# Patient Record
Sex: FEMALE | Race: WHITE | NOT HISPANIC OR LATINO | Employment: FULL TIME | URBAN - METROPOLITAN AREA
[De-identification: names, ages, dates, MRNs, and addresses within clinical notes are randomized per-mention and may not be internally consistent; named-entity substitution may affect disease eponyms.]

---

## 2017-05-16 ENCOUNTER — TELEPHONE (OUTPATIENT)
Dept: OBSTETRICS AND GYNECOLOGY | Facility: CLINIC | Age: 32
End: 2017-05-16

## 2017-05-16 NOTE — TELEPHONE ENCOUNTER
----- Message from Chel Gudino sent at 5/16/2017  9:14 AM CDT -----  Contact: LIBRA MEDEIROS [[id number]]  X_  1st Request  _  2nd Request  _  3rd Request        Who: LIBRA MEDEIROS [[id number]]    Why: Patient states she being referred from Dr Shell Raymundo for recurrent yeast infections. She would like to schedule an [facility]t [facility]. Thanks.      What Number to Call Back: [phone]    When to Expect a call back: (Before the end of the day)   -- if call after 3:00 call back will be tomorrow.

## 2017-05-16 NOTE — TELEPHONE ENCOUNTER
LM on VM that [facility] appts for NP not available at this time and will call pt back when schedule becomes available, possibly end August/early September.

## 2017-05-22 ENCOUNTER — TELEPHONE (OUTPATIENT)
Dept: OBSTETRICS AND GYNECOLOGY | Facility: CLINIC | Age: 32
End: 2017-05-22

## 2017-05-22 NOTE — TELEPHONE ENCOUNTER
----- Message from Wesley Carlos III, MD sent at 5/22/2017  1:59 PM CDT -----  Regarding: FW: clinic appointment  Marj  Please schedule this pt on Thursday 6/8/17  ----- Message -----  From: Joyce Haddad MD  Sent: 5/22/2017   1:45 PM  To: Wesley Carlos III, MD, John Matthews RN  Subject: clinic appointment                               I asked Dr. Carlos for a favor :)    This is a very sweet friend of mine who is undergoing fertility treatments with Shell Raymundo. She has some sort of chronic vulvovaginal infections. She has had ureaplasma in the past. [facility]ary referred her to [facility] but she was told there wasn't a new patient appointment available until September.     I spoke with john and he said he would be willing to do me a favor and see her sooner in the next few weeks :)     Thank you!

## 2017-06-07 ENCOUNTER — TELEPHONE (OUTPATIENT)
Dept: OBSTETRICS AND GYNECOLOGY | Facility: CLINIC | Age: 32
End: 2017-06-07

## 2017-06-07 NOTE — TELEPHONE ENCOUNTER
----- Message from Mahnaz Clayton sent at 6/7/2017  8:57 AM CDT -----  Contact: self  Patient states she will r/s for June 9 at 9:00am. Patient however is inquiring whether or not she can move her appointment for next week for the [facility]. Patient advised Marj was out of the office and will return her call tomorrow. Patient states that if no appointment is available for next week she will keep her appointment on Friday June 9th. Patient can be reached at [phone] Thanks!

## 2017-06-07 NOTE — TELEPHONE ENCOUNTER
Patient was informed that Dr. Terrance ALMODOVAR does not have any appointments available on next week for the [facility],patient was instructed to keep her scheduled visit on Friday 6/9 at 9:00am,verbilazed understanding.

## 2017-06-09 ENCOUNTER — OFFICE VISIT (OUTPATIENT)
Dept: OBSTETRICS AND GYNECOLOGY | Facility: CLINIC | Age: 32
End: 2017-06-09
Attending: OBSTETRICS & GYNECOLOGY
Payer: COMMERCIAL

## 2017-06-09 VITALS
SYSTOLIC BLOOD PRESSURE: 100 MMHG | BODY MASS INDEX: 18.99 KG/M2 | WEIGHT: 103.19 LBS | DIASTOLIC BLOOD PRESSURE: 78 MMHG | HEIGHT: 62 IN

## 2017-06-09 DIAGNOSIS — N89.8 VAGINAL DISCHARGE: ICD-10-CM

## 2017-06-09 DIAGNOSIS — L90.0 LICHEN SCLEROSUS: ICD-10-CM

## 2017-06-09 DIAGNOSIS — B37.31 CANDIDIASIS OF VULVA AND VAGINA: ICD-10-CM

## 2017-06-09 DIAGNOSIS — L29.2 PRURITUS OF VULVA: Primary | ICD-10-CM

## 2017-06-09 DIAGNOSIS — N94.10 DYSPAREUNIA, FEMALE: ICD-10-CM

## 2017-06-09 PROCEDURE — 99999 PR PBB SHADOW E&M-EST. PATIENT-LVL III: CPT | Mod: PBBFAC,,, | Performed by: OBSTETRICS & GYNECOLOGY

## 2017-06-09 PROCEDURE — 99244 OFF/OP CNSLTJ NEW/EST MOD 40: CPT | Mod: S$GLB,,, | Performed by: OBSTETRICS & GYNECOLOGY

## 2017-06-09 PROCEDURE — 87210 SMEAR WET MOUNT SALINE/INK: CPT | Mod: QW,S$GLB,, | Performed by: OBSTETRICS & GYNECOLOGY

## 2017-06-09 PROCEDURE — 87102 FUNGUS ISOLATION CULTURE: CPT

## 2017-06-09 RX ORDER — MEDROXYPROGESTERONE ACETATE 10 MG/1
TABLET ORAL
Refills: 0 | COMMUNITY
Start: 2017-05-11 | End: 2024-01-17

## 2017-06-09 RX ORDER — CLOBETASOL PROPIONATE 0.5 MG/G
OINTMENT TOPICAL
Qty: 45 G | Refills: 3 | Status: SHIPPED | OUTPATIENT
Start: 2017-06-09 | End: 2024-01-17

## 2017-07-12 LAB — FUNGUS SPEC CULT: NORMAL

## 2018-03-28 ENCOUNTER — TELEPHONE (OUTPATIENT)
Dept: OBSTETRICS AND GYNECOLOGY | Facility: CLINIC | Age: 33
End: 2018-03-28

## 2018-03-28 NOTE — TELEPHONE ENCOUNTER
----- Message from Rolly Blake sent at 3/28/2018 10:57 AM CDT -----  Contact: pt  x_ 1st Request  _ 2nd Request  _ 3rd Request    Who: pt    Why: is needing to schedule a appointment for [facility]    What Number to Call Back: [phone]    When to Expect a call back: (Before the end of the day)  -- if call after 3:00 call back will be tomorrow.

## 2018-03-28 NOTE — TELEPHONE ENCOUNTER
States recently found out she is pregnant and OB took her off clobetasol. Wants to come in to discuss with Dr Carlos, states hasn't follow up since last visit. Denies any problems. States coming in from [place] and appt date given as requested

## 2018-04-24 ENCOUNTER — OFFICE VISIT (OUTPATIENT)
Dept: OBSTETRICS AND GYNECOLOGY | Facility: CLINIC | Age: 33
End: 2018-04-24
Attending: OBSTETRICS & GYNECOLOGY
Payer: COMMERCIAL

## 2018-04-24 VITALS
BODY MASS INDEX: 17.64 KG/M2 | HEIGHT: 62 IN | DIASTOLIC BLOOD PRESSURE: 80 MMHG | SYSTOLIC BLOOD PRESSURE: 128 MMHG | WEIGHT: 95.88 LBS

## 2018-04-24 DIAGNOSIS — N94.10 DYSPAREUNIA, FEMALE: ICD-10-CM

## 2018-04-24 DIAGNOSIS — N89.8 VAGINAL DISCHARGE: ICD-10-CM

## 2018-04-24 DIAGNOSIS — L90.0 LICHEN SCLEROSUS: ICD-10-CM

## 2018-04-24 DIAGNOSIS — L29.2 PRURITUS OF VULVA: Primary | ICD-10-CM

## 2018-04-24 PROCEDURE — 99213 OFFICE O/P EST LOW 20 MIN: CPT | Mod: S$GLB,,, | Performed by: OBSTETRICS & GYNECOLOGY

## 2018-04-24 PROCEDURE — 99999 PR PBB SHADOW E&M-EST. PATIENT-LVL III: CPT | Mod: PBBFAC,,, | Performed by: OBSTETRICS & GYNECOLOGY

## 2018-04-24 RX ORDER — DOXYLAMINE SUCCINATE AND PYRIDOXINE HYDROCHLORIDE 10; 10 MG/1; MG/1
TABLET, DELAYED RELEASE ORAL
COMMUNITY
Start: 2018-04-17 | End: 2024-01-17

## 2018-04-24 RX ORDER — HEPARIN SODIUM 5000 [USP'U]/ML
INJECTION, SOLUTION INTRAVENOUS; SUBCUTANEOUS
COMMUNITY
Start: 2018-04-03 | End: 2024-01-17

## 2018-04-24 RX ORDER — DESONIDE 0.5 MG/G
CREAM TOPICAL
COMMUNITY
Start: 2018-03-02 | End: 2024-01-17

## 2018-04-24 RX ORDER — ESTRADIOL 2 MG/1
TABLET ORAL
COMMUNITY
Start: 2018-04-18 | End: 2024-01-17

## 2018-04-24 NOTE — PROGRESS NOTES
Subjective:     Patient ID: Brielle Sharpe is a 32 y.o. female.     Chief Complaint: No chief complaint on file.     History of Present Illness: This patient is a 32 y.o. female, who [facility] for evaluation of probable early onset lichen sclerosis.  The patient is pregnant with twins approximately 8 weeks gestation today she is concerned about the use of the clobetasol and has been advised to discontinue the use of clobetasol while she is pregnant.     No LMP recorded.    Review of Systems    GENERAL: No fever, chills, fatigability or weightchange  SKIN: No rashes, itching or changes in color or texture of skin.  HEAD: No headaches or recent head trauma.  EYES: Visual acuity fine. No photophobia,r diplopia.  EARS: Denies earache or vertigo  NOSE: No loss of smell, no epistaxis or postnasal drip.  MOUTH & THROAT: No hoarseness or change in voice.   NODES: Denies swollen glands.  CHEST: Denies VIRAMONTES, cyanosis, wheezing, cough and sputum production.  CARDIOVASCULAR: Denies chest pain, PND, orthopnea or reduced exercise tolerance.  ABDOMEN: Appetite fine. No weight loss. bloating, Denies diarrhea, abdominal pain, hematemesis or blood in stool.  URINARY: No flank pain, dysuria or hematuria.  PERIPHERAL VASCULAR: No claudication or cyanosis.Varicosities  MUSCULOSKELETAL: No joint stiffness or swelling. Denies back pain.muscle aches  NEUROLOGIC: No history of seizures, paralysis, alteration of gait or coordination.       Objective:       Physical Exam     APPEARANCE: Well nourished, well developed, in no acute distress.    GENITOURINARY:  Vulva: There was no erythema, lichenification, nor excoriation of the labia majoria, perineum and perirectal areas.  There was no obvious loss of labia architecture (reabsorption of the labia minora).  There was fusion of the prepuce and and clitoris with scarring of the clitoral cintron and minimal burying of the clitoris. There were no ulcerations nor hyperplastic  plaques.   There were no white plaques noted, there was no thinning of the perineal and perianal skin--the butterfly lesion.  There was no depigmentation of the skin noted.  No erosions were noted   Q-Tip test indicates no evidence of vestibulodynia.  Urethral Meatus: Normal size and location, no lesions, no prolapse.  Urethra: No masses, tenderness, prolapse or scarring.  Vagina: Moist with rugae, discharge of old blood noted, no significant cystocele or rectocele.  Cervix: not examined  Uterus: not examined  Adnexa: not examined  Anus Perineum: No lesions, no relaxation, no external hemorrhoids.  Abdomen: No masses, tenderness, hernia or ascites, no hepatasplenomegaly  Skin: No rashes, lesions, ulcers, acne, hirsutism.  Peripheral/lower extremities: No edema, erythema or tenderness.  Lymphatic: No axillary, neck or groin nodes palp.  Mental Status: Alert, oriented x 3, normal affect and mood.          @PROCEDURE:@  Wet Prep:  Not dine           Assessment:      1. Pruritus of vulva    2. Possible Lichen sclerosus/Planus    3. Vaginal discharge    4. Dyspareunia, female               Plan:  1.  Discussed the use of clobetasol in a patient with early lichen sclerosus.  The patient was advised that it is unlikely that enough clobetasol would be absorbed to harm the twins.  But if she is asymptomatic is no necessity at this point in time to continue routine clobetasol application.  She was advised to intermittently use clobetasol for symptoms only.  2.  Return to clinic when necessary.                    kelley in this encounter.

## 2023-12-08 ENCOUNTER — TELEPHONE (OUTPATIENT)
Dept: OBSTETRICS AND GYNECOLOGY | Facility: CLINIC | Age: 38
End: 2023-12-08
Payer: COMMERCIAL

## 2023-12-08 NOTE — TELEPHONE ENCOUNTER
Called patient and made appointment    Offered appt on 12/12/23. Pt declined. Pt scheduled for 1/9/2024    Patient verbalized understanding

## 2023-12-08 NOTE — TELEPHONE ENCOUNTER
----- Message from Jerzy Wilkes sent at 12/6/2023 12:54 PM CST -----      Name of Who is Calling: LIBRA MEDEIROS [[id number]]      What is the request in detail: Pt called to schedule an appt.Please contact to further discuss and advise.          Can the clinic reply by [facility]: Y      [facility]: [phone]

## 2024-01-17 ENCOUNTER — OFFICE VISIT (OUTPATIENT)
Dept: OBSTETRICS AND GYNECOLOGY | Facility: CLINIC | Age: 39
End: 2024-01-17
Payer: COMMERCIAL

## 2024-01-17 VITALS — HEIGHT: 62 IN | WEIGHT: 110 LBS | BODY MASS INDEX: 20.24 KG/M2

## 2024-01-17 DIAGNOSIS — L90.0 LICHEN SCLEROSUS: Primary | ICD-10-CM

## 2024-01-17 DIAGNOSIS — L29.2 PRURITUS OF VULVA: ICD-10-CM

## 2024-01-17 DIAGNOSIS — N94.10 DYSPAREUNIA IN FEMALE: ICD-10-CM

## 2024-01-17 PROCEDURE — 99999 PR PBB SHADOW E&M-EST. PATIENT-LVL III: CPT | Mod: PBBFAC,,, | Performed by: OBSTETRICS & GYNECOLOGY

## 2024-01-17 PROCEDURE — 1159F MED LIST DOCD IN RCRD: CPT | Mod: CPTII,S$GLB,, | Performed by: OBSTETRICS & GYNECOLOGY

## 2024-01-17 PROCEDURE — 87102 FUNGUS ISOLATION CULTURE: CPT | Performed by: OBSTETRICS & GYNECOLOGY

## 2024-01-17 PROCEDURE — 3008F BODY MASS INDEX DOCD: CPT | Mod: CPTII,S$GLB,, | Performed by: OBSTETRICS & GYNECOLOGY

## 2024-01-17 PROCEDURE — 99213 OFFICE O/P EST LOW 20 MIN: CPT | Mod: S$GLB,,, | Performed by: OBSTETRICS & GYNECOLOGY

## 2024-01-17 NOTE — PROGRESS NOTES
Subjective:     Patient ID: Brielle Sharpe is a 38 y.o. female.     Chief Complaint: Lichen Sclerosus     History of Present Illness: This patient is a 38 y.o. female, who presents [facility] for evaluation of possible lichen sclerosus.    Patient's last menstrual period was 01/01/2024 (exact date).    Review of Systems    GENERAL: No fever, chills, fatigability or weightchange  SKIN: No rashes, itching or changes in color or texture of skin.  HEAD: No headaches or recent head trauma.  EYES: Visual acuity fine. No photophobia,r diplopia.  EARS: Denies earache or vertigo  NOSE: No loss of smell, no epistaxis or postnasal drip.  MOUTH & THROAT: No hoarseness or change in voice.   NODES: Denies swollen glands.  CHEST: Denies VIRAMONTES, cyanosis, wheezing, cough and sputum production.  CARDIOVASCULAR: Denies chest pain, PND, orthopnea or reduced exercise tolerance.  ABDOMEN: Appetite fine. No weight loss. bloating, Denies diarrhea, abdominal pain, hematemesis or blood in stool.  URINARY: No flank pain, dysuria or hematuria.  PERIPHERAL VASCULAR: No claudication or cyanosis.Varicosities  MUSCULOSKELETAL: No joint stiffness or swelling. Denies back pain.muscle aches  NEUROLOGIC: No history of seizures, paralysis, alteration of gait or coordination.       Objective:       Physical Exam     APPEARANCE: Well nourished, well developed, in no acute distress.    GENITOURINARY:  Vulva: No lesions. Normal female genital architecture there is questionable early adhesions noted between the clitoris and prepuce..   Urethral Meatus: Normal size and location, no lesions, no prolapse.  Urethra: No masses, tenderness, prolapse or scarring.  Vagina:  Moist with rugae, no discharge, no significant cystocele or rectocele.  Cervix: No lesions, normal diameter, no stenosis, no cervical motion tenderness. .  Uterus: 6 week size, regular shape, mobile, non-tender, normal position, good support.  Adnexa: No masses, tenderness or CDS  nodularity.  Anus Perineum: No lesions, no relaxation, no external hemorrhoids.  Abdomen: No masses, tenderness, hernia or ascites, no hepatasplenomegaly  Skin: No rashes, lesions, ulcers, acne, hirsutism.  Peripheral/lower extremities: No edema, erythema or tenderness.  Lymphatic: No axillary, neck or groin nodes palp.  Mental Status: Alert, oriented x 3, normal affect and mood.          @PROCEDURE:@         Assessment:      1. Possible Lichen sclerosus    2. History of Pruritus of vulva    3. Dyspareunia in female               Plan:  Discuss the fact the patient has no obvious physical findings suggestive of lichen sclerosus.  Patient has discomfort with relations will order some pelvic floor physical therapy to see if that will improve that problem.  Return to clinic p.r.n. symptoms.                  No orders of the defined types were placed in this encounter.

## 2024-02-19 LAB — FUNGUS SPEC CULT: NORMAL

## 2024-02-20 NOTE — PROGRESS NOTES
Subjective:     Patient ID: Brielle Sharpe is a 31 y.o. female.     Chief Complaint: Vaginal Discharge (new patient, ref Drs Boo and Zackary. Progressive issues over last several years.); Vulvar Itch (also burning); and Dyspareunia     History of Present Illness: This patient is a 31 y.o. female, who presents [facility] for evaluation of vulvar irritation and itching and vaginal discharge.  The problem began 4-5 years ago but his gotten worse in the last 1-2 years especially the last year.  There seems to be in association with the onset of the itching and discomfort in the menstrual cycle.  She has been treated on numerous occasions for yeast for BV and for vaginal bacterial positive cultures.  None of the therapies have resolved her problem.  She is now experiencing dyspareunia.     Patient's last menstrual period was 05/25/2017.    Review of Systems    GENERAL: No fever, chills, fatigability or weightchange  SKIN: No rashes, itching or changes in color or texture of skin.  HEAD: No headaches or recent head trauma.  EYES: Visual acuity fine. No photophobia,r diplopia.  EARS: Denies earache or vertigo  NOSE: No loss of smell, no epistaxis or postnasal drip.  MOUTH & THROAT: No hoarseness or change in voice.   NODES: Denies swollen glands.  CHEST: Denies VIRAMONTES, cyanosis, wheezing, cough and sputum production.  CARDIOVASCULAR: Denies chest pain, PND, orthopnea or reduced exercise tolerance.  ABDOMEN: Appetite fine. No weight loss. bloating, Denies diarrhea, abdominal pain, hematemesis or blood in stool.  URINARY: No flank pain, dysuria or hematuria.  PERIPHERAL VASCULAR: No claudication or cyanosis.Varicosities  MUSCULOSKELETAL: No joint stiffness or swelling. Denies back pain.muscle aches  NEUROLOGIC: No history of seizures, paralysis, alteration of gait or coordination.       Objective:       Physical Exam     APPEARANCE: Well nourished, well developed, in no acute distress.    GENITOURINARY:  Vulva:  There  was no erythema, lichenification, nor excoriation of the labia majoria, perineum and perirectal areas.  There was no obvious loss of labia architecture (reabsorption of the labia minora).  There was fusion of the prepuce and and clitoris with scarring of the clitoral cintron and minimal burying of the clitoris. There were no ulcerations nor hyperplastic plaques.   There were no white plaques noted, there was no thinning of the perineal and perianal skin--the butterfly lesion.  There was no depigmentation of the skin noted.  No erosions were noted   Q-Tip test indicates no evidence of vestibulodynia.  Urethral Meatus: Normal size and location, no lesions, no prolapse.  Urethra: No masses, tenderness, prolapse or scarring.  Vagina: Moist with rugae, no discharge, no significant cystocele or rectocele.  No erosions were noted.   Cervix: No lesions, normal diameter, no stenosis, no cervical motion tenderness. .  Uterus: 6 week size, regular shape, mobile, non-tender, normal position, good support.  Adnexa: No masses, tenderness or CDS nodularity.  Anus Perineum: No lesions, no relaxation, no external hemorrhoids.  Abdomen: No masses, tenderness, hernia or ascites, no hepatasplenomegaly  Skin: No rashes, lesions, ulcers, acne, hirsutism.  Peripheral/lower extremities: No edema, erythema or tenderness.  Lymphatic: No axillary, neck or groin nodes palp.  Mental Status: Alert, oriented x 3, normal affect and mood.  Oral:There was questionable changes on the left buccal mucous membrane reminiscent of Gregorio's striae.           @PROCEDURE:@  Wet Prep:  pH = 3.6  -WBCS = rare  -Lactobacilli = noted  -BV = Amsel negative  -Candida = Hyphae none seen  -Trichomnas = none seen  -Cells- Basal and Parabasal, Superficial: Maturation: Superficial cells predominate  -Impression: Negative wet prep  -Treatment: None indicated  -[facility] in 6 months       Assessment:      1. Pruritus of vulva    2. Possible Lichen sclerosus/Planus     3. Vaginal discharge    4. Dyspareunia, female    5. Candidiasis of vulva and vagina               Plan:  1.  Clobetasol ointment therapy to be applied and a small amount twice a day for week once a day for week then Monday and Thursday.  Discussed the ability to get pregnant if the patient has Lichen sclerosis or planus and the continued use of clobetasol once pregnancy occur.   2.  Discussed the theoretical cause of lichen sclerosus/planus, the relationship to immunological abnormalities, the low association with precancerous vulvar changes, the need for six-month follow-up.  3.  Discussed the fact that my opinion is that vaginal cultures do not indicate significant bacterial vaginal problems nor would be overly concerned with infertility related to any positive bacterial vaginal culture.  4.  Discussed the need for biopsies for areas that or U the erosive or thickened white plaques that do not heal after the use of clobetasol.  5.  We'll reevaluate in 6 months  6.  Discussed the use of pelvic floor physical therapy after the initial therapy clobetasol therapy to help reduce the problems of dyspareunia.  7.  Dr. Haddad will be notified of my findings.         Orders Placed This Encounter   Procedures    Fungus culture               4 = No assist / stand by assistance

## 2024-03-25 ENCOUNTER — OFFICE VISIT (OUTPATIENT)
Dept: OBSTETRICS AND GYNECOLOGY | Facility: CLINIC | Age: 39
End: 2024-03-25
Payer: COMMERCIAL

## 2024-03-25 VITALS
SYSTOLIC BLOOD PRESSURE: 121 MMHG | HEART RATE: 76 BPM | DIASTOLIC BLOOD PRESSURE: 88 MMHG | BODY MASS INDEX: 19.68 KG/M2 | WEIGHT: 107.56 LBS

## 2024-03-25 DIAGNOSIS — N64.4 BREAST PAIN, LEFT: ICD-10-CM

## 2024-03-25 DIAGNOSIS — N94.10 DYSPAREUNIA IN FEMALE: ICD-10-CM

## 2024-03-25 DIAGNOSIS — Z12.4 SCREENING FOR CERVICAL CANCER: ICD-10-CM

## 2024-03-25 DIAGNOSIS — Z01.419 ROUTINE GYNECOLOGICAL EXAMINATION: Primary | ICD-10-CM

## 2024-03-25 PROCEDURE — 3008F BODY MASS INDEX DOCD: CPT | Mod: CPTII,S$GLB,, | Performed by: NURSE PRACTITIONER

## 2024-03-25 PROCEDURE — 1159F MED LIST DOCD IN RCRD: CPT | Mod: CPTII,S$GLB,, | Performed by: NURSE PRACTITIONER

## 2024-03-25 PROCEDURE — 1160F RVW MEDS BY RX/DR IN RCRD: CPT | Mod: CPTII,S$GLB,, | Performed by: NURSE PRACTITIONER

## 2024-03-25 PROCEDURE — 99999 PR PBB SHADOW E&M-EST. PATIENT-LVL III: CPT | Mod: PBBFAC,,, | Performed by: NURSE PRACTITIONER

## 2024-03-25 PROCEDURE — 88175 CYTOPATH C/V AUTO FLUID REDO: CPT | Performed by: NURSE PRACTITIONER

## 2024-03-25 PROCEDURE — 87624 HPV HI-RISK TYP POOLED RSLT: CPT | Performed by: NURSE PRACTITIONER

## 2024-03-25 PROCEDURE — 3074F SYST BP LT 130 MM HG: CPT | Mod: CPTII,S$GLB,, | Performed by: NURSE PRACTITIONER

## 2024-03-25 PROCEDURE — 99395 PREV VISIT EST AGE 18-39: CPT | Mod: S$GLB,,, | Performed by: NURSE PRACTITIONER

## 2024-03-25 PROCEDURE — 3079F DIAST BP 80-89 MM HG: CPT | Mod: CPTII,S$GLB,, | Performed by: NURSE PRACTITIONER

## 2024-03-25 NOTE — PROGRESS NOTES
Chief Complaint: Well Woman Exam     HPI:      Brielle is a 38 y.o.  who presents today for well woman exam.      Denies any breast, vaginal, urinary complaints or pelvic pain today. Saturday - noticed breast pain - felt like something was pulling. No skin changes/nipple changes. Denies any abnormal lumps or bumps.     Patient's last menstrual period was 2024. Denies AUB.     Brielle is currently sexually active with a single male partner. . She is currently being seen at [facility] - considering embryo transfer.   She has a history of recurrent vaginal infections and dyspareunia. She recently saw Dr. Carlos due to these concerns and was referred to pelvic floor PT. She is thinking about doing this once she completes embryo transfer.     Previous Pap: Negative per patient (3/2022) . Denies hx of abnormal pap tests.   Previous Mammogram: BiRads: 1 T-C Score: 11.6% (2024)    Gardasil:Completed     She endorses family history of breast, GYN, colon, or  cancers - maternal uncle, CRC; father, BCC, SCC; MGFPGF     OB History          1    Para   1    Term   1            AB        Living   2         SAB        IAB        Ectopic        Multiple   1    Live Births   2               ROS:     GENERAL: Feeling well overall.   CARDIOVASCULAR: Denies palpitations or chest pain.   RESPIRATORY: Denies shortness of breath.  BREASTS: see HPI.  ABDOMEN: Denies constipation, diarrhea, blood in stool.  URINARY: see HPI.  REPRODUCTIVE: see HPI.  PSYCHIATRIC: Denies uncontrolled depression or anxiety.    Physical Exam:      Physical Exam     /88   Pulse 76   Wt 48.8 kg (107 lb 9.4 oz)   LMP 2024   BMI 19.68 kg/m²   Body mass index is 19.68 kg/m².     APPEARANCE: Well nourished, well developed, in no acute distress.  PSYCH: Appropriate mood and affect.  SKIN: No acne or hirsutism.  CARDIOVASCULAR: Regular rate and rhythm. No edema of peripheral extremities.  PULMONARY: Effort and  breath sounds normal.  NECK: Neck symmetric without masses. No thyromegaly.  NODES: No axillary lymph node enlargement.  BREASTS: Symmetrical, no visible skin lesions. No palpable masses. No nipple discharge bilaterally.  PELVIC: Normal external genitalia without lesions. Thinning tissue noted around vaginal introitus. Normal hair distribution.  Adequate perineal body, normal urethral meatus.  Vagina moist and smooth. Without lesions. Vagina without abnormal discharge.  Cervix without lesions, abnormal discharge, or tenderness. Wide ectropion No significant cystocele or rectocele.  Bimanual exam shows uterus to be normal size, regular, mobile and nontender.  Adnexa without masses or tenderness.      Assessment/Plan:     Routine gynecological examination  -     HPV High Risk Genotypes, PCR  -     Liquid-Based Pap Smear, Screening    Screening for cervical cancer  -     HPV High Risk Genotypes, PCR  -     Liquid-Based Pap Smear, Screening    Breast pain, left  -     Mammo Digital Diagnostic Bilat with Dg; Future; Expected date: 03/25/2024  -     US Breast Bilateral Limited; Future; Expected date: 03/25/2024    Dyspareunia in female    PLAN:    Pap test collected  Gardasil up to date  Diagnostic breast imaging ordered for further evaluation of left breat pain  After trial of pelvic floor PT for dyspareunia can consider treatment with vaginal hormonal cream     Follow up in about 1 year (around 3/25/2025), or if symptoms worsen or fail to improve.    Counseling:     Patient was counseled today on the recommendation for yearly wellness exams, importance of breast self awareness and annual mammograms, as well as the current ASCCP pap guidelines. She was counseled on importance of regular exercise. She is to see her PCP for other health maintenance.     Use of the [employer] Patient Portal discussed and encouraged during today's visit.

## 2024-03-27 LAB
HPV HR 12 DNA SPEC QL NAA+PROBE: NEGATIVE
HPV16 AG SPEC QL: NEGATIVE
HPV18 DNA SPEC QL NAA+PROBE: NEGATIVE

## 2024-03-28 LAB
FINAL PATHOLOGIC DIAGNOSIS: NORMAL
Lab: NORMAL

## 2024-04-04 ENCOUNTER — OFFICE VISIT (OUTPATIENT)
Dept: CARDIOLOGY | Facility: CLINIC | Age: 39
End: 2024-04-04
Payer: COMMERCIAL

## 2024-04-04 VITALS
WEIGHT: 105.81 LBS | DIASTOLIC BLOOD PRESSURE: 78 MMHG | BODY MASS INDEX: 19.47 KG/M2 | HEIGHT: 62 IN | SYSTOLIC BLOOD PRESSURE: 109 MMHG | HEART RATE: 87 BPM | RESPIRATION RATE: 20 BRPM

## 2024-04-04 DIAGNOSIS — R00.2 PALPITATIONS: Primary | ICD-10-CM

## 2024-04-04 DIAGNOSIS — I49.3 PVC'S (PREMATURE VENTRICULAR CONTRACTIONS): ICD-10-CM

## 2024-04-04 PROCEDURE — 99999 PR PBB SHADOW E&M-EST. PATIENT-LVL III: CPT | Mod: PBBFAC,,, | Performed by: INTERNAL MEDICINE

## 2024-04-04 PROCEDURE — 1159F MED LIST DOCD IN RCRD: CPT | Mod: CPTII,S$GLB,, | Performed by: INTERNAL MEDICINE

## 2024-04-04 PROCEDURE — 93000 ELECTROCARDIOGRAM COMPLETE: CPT | Mod: S$GLB,,, | Performed by: INTERNAL MEDICINE

## 2024-04-04 PROCEDURE — 3074F SYST BP LT 130 MM HG: CPT | Mod: CPTII,S$GLB,, | Performed by: INTERNAL MEDICINE

## 2024-04-04 PROCEDURE — 3078F DIAST BP <80 MM HG: CPT | Mod: CPTII,S$GLB,, | Performed by: INTERNAL MEDICINE

## 2024-04-04 PROCEDURE — 99204 OFFICE O/P NEW MOD 45 MIN: CPT | Mod: 25,S$GLB,, | Performed by: INTERNAL MEDICINE

## 2024-04-04 PROCEDURE — 3008F BODY MASS INDEX DOCD: CPT | Mod: CPTII,S$GLB,, | Performed by: INTERNAL MEDICINE

## 2024-04-04 PROCEDURE — 1160F RVW MEDS BY RX/DR IN RCRD: CPT | Mod: CPTII,S$GLB,, | Performed by: INTERNAL MEDICINE

## 2024-04-04 NOTE — PROGRESS NOTES
HISTORY:    37 yo F w a h/o PVCs presenting for initial evaluation by me.    Over the last 5 months has noted intermittent skipped beats or extra heart beats. Non-limiting, but she is aware. Symptoms stable. Underwent zio patch that demonstrated symptomatic PVCs, rare.     The patient denies any symptoms of chest pain, shortness of breath, or dyspnea on exertion.    Activity levels moderate. Chases her 5 year old twins. No dedicated exercise. CRNA by training.    The patient denies any previous history of myocardial infarction, coronary artery disease, peripheral arterial disease, stroke, congestive heart failure, or cardiomyopathy.    PHYSICAL EXAM:    Vitals:    04/04/24 1107   BP: 109/78   Pulse: 87   Resp: 20       NAD, A+Ox3.  No jvd, no bruit.  RRR nml s1,s2. No murmurs.  CTA B no wheezes or crackles.  No edema.    LABS/STUDIES (imaging reviewed during clinic visit):    November 2023 [facility] CBC and CMP normal.  /HDL 63//TG 50.  A1c normal.  TSH mildly decreased with a normal free T4.    ECG today demonstrates sinus rhythm with no Q-waves or ST changes.    Zio patch monitor January 2024 demonstrates sinus rhythm with an average heart rate of 90 beats per minute.  One run of SVT lasting 50 seconds during sleeping hours.  Otherwise rare ectopy.  Symptoms associated with sinus rhythm and PVCs.      ASSESSMENT & PLAN:    1. Palpitations    2. PVC's (premature ventricular contractions)        Orders Placed This Encounter    IN OFFICE EKG 12-LEAD (to [place])    Echo      Mildly symptomatic PVCs.    Check TTE.     Follow up in about 1 year (around 4/4/2025).      Gurvinder Abraham MD

## 2024-04-05 LAB
OHS QRS DURATION: 74 MS
OHS QTC CALCULATION: 426 MS

## 2024-04-12 ENCOUNTER — HOSPITAL ENCOUNTER (OUTPATIENT)
Dept: RADIOLOGY | Facility: OTHER | Age: 39
Discharge: HOME | End: 2024-04-12
Attending: NURSE PRACTITIONER
Payer: COMMERCIAL

## 2024-04-12 DIAGNOSIS — N64.4 BREAST PAIN, LEFT: ICD-10-CM

## 2024-04-12 PROCEDURE — 76642 ULTRASOUND BREAST LIMITED: CPT | Mod: 26,LT,, | Performed by: RADIOLOGY

## 2024-04-12 PROCEDURE — 76642 ULTRASOUND BREAST LIMITED: CPT | Mod: TC,LT

## 2024-04-15 ENCOUNTER — OFFICE VISIT (OUTPATIENT)
Dept: OBSTETRICS AND GYNECOLOGY | Facility: CLINIC | Age: 39
End: 2024-04-15
Payer: COMMERCIAL

## 2024-04-15 VITALS
SYSTOLIC BLOOD PRESSURE: 104 MMHG | WEIGHT: 107.81 LBS | BODY MASS INDEX: 19.72 KG/M2 | DIASTOLIC BLOOD PRESSURE: 68 MMHG

## 2024-04-15 DIAGNOSIS — N89.8 VAGINAL DISCHARGE: Primary | ICD-10-CM

## 2024-04-15 DIAGNOSIS — L29.2 VULVAR ITCHING: ICD-10-CM

## 2024-04-15 PROCEDURE — 3074F SYST BP LT 130 MM HG: CPT | Mod: CPTII,S$GLB,, | Performed by: NURSE PRACTITIONER

## 2024-04-15 PROCEDURE — 99999 PR PBB SHADOW E&M-EST. PATIENT-LVL III: CPT | Mod: PBBFAC,,, | Performed by: NURSE PRACTITIONER

## 2024-04-15 PROCEDURE — 81514 NFCT DS BV&VAGINITIS DNA ALG: CPT | Performed by: NURSE PRACTITIONER

## 2024-04-15 PROCEDURE — 1159F MED LIST DOCD IN RCRD: CPT | Mod: CPTII,S$GLB,, | Performed by: NURSE PRACTITIONER

## 2024-04-15 PROCEDURE — 3008F BODY MASS INDEX DOCD: CPT | Mod: CPTII,S$GLB,, | Performed by: NURSE PRACTITIONER

## 2024-04-15 PROCEDURE — 3078F DIAST BP <80 MM HG: CPT | Mod: CPTII,S$GLB,, | Performed by: NURSE PRACTITIONER

## 2024-04-15 PROCEDURE — 99213 OFFICE O/P EST LOW 20 MIN: CPT | Mod: S$GLB,,, | Performed by: NURSE PRACTITIONER

## 2024-04-15 NOTE — PROGRESS NOTES
Chief Complaint: Vaginitis     HPI:      Brielle is a 38 y.o.  who presents today due to vulvar itching and vaginal discharge.  She had a hysteroscopy and biopsy on 3/8 with fertility provider - diagnosed her with endometritis - treated her with Doxycyline x 10 days. She finished prescription and then started having vulvar itching so took 1 dose of Diflucan with mild relief. Starting 1-2 weeks ago she started having vulvar itching again so she started using Clobetasol ointment - used as needed x 2 days but stopped because itching got worse. She then noticed vaginal discharge 4 days ago - yellow, green vaginal discharge.   Of note - she also had an EMB on  to ensure endometritis resolved. She was told that the biopsy had fluid in it. She has not received the results yet.     She has a history of recurrent vaginal infections, vulvar itching and dyspareunia. Dr. Carlos suspected lichen sclerosis and she was previously using clobetasol ointment but has not had issues in years and really has only had to use the clobetasol intermittent. She recently saw Dr. Carlos for a check and was referred to pelvic floor PT due to the dyspareunia. She is thinking about doing this once she completes her care with fertility specialist.     Menses are regular. Patient's last menstrual period was 2024.     Brielle is currently sexually active with a single male partner. She is currently using no method for contraception. Seeing fertility specialists.     Previous Pap: NILM, HPV negative (3/28/2024)      Physical Exam:      PHYSICAL EXAM:  /68   Wt 48.9 kg (107 lb 12.9 oz)   LMP 2024   BMI 19.72 kg/m²   Body mass index is 19.72 kg/m².     APPEARANCE: Well nourished, well developed, in no acute distress.  PELVIC:  Mild erythema noted diffusely on labia minora and majora. Clitoral cintron not adhered and able to be reduced. No phimosis noted.  Vagina without lesions, with  thick, white clumyp discharge, without  erythema, without ulcers.  Cervix without cervical motion tenderness, non-friable. Bimanual exam deferred.     Assessment/Plan:     Vaginal discharge  -     Vaginosis Screen by DNA Probe    Vulvar itching  -     Vaginosis Screen by DNA Probe    PLAN:    Affirm collected.   Vulvar/vaginal skin care discussed.   Keep your vulva clean by rinsing with warm water and gently patting, not rubbing, it dry.  Do not use perfumed soap or scented toilet paper. Do not use feminine hygiene sprays. Do not use pads or tampons that contain a deodorant or a plastic coating  Do not wear tight-fitting pants or underwear. Wear only cotton underwear.  Do not wear pantyhose (unless they have a cotton crotch).  Do not douche. It is better to let the vagina cleanse itself.  Use a lubricant that is water soluble and unscented (e.g. Good Clean Love)    Follow-up with worsening or recurrent symptoms.

## 2024-04-16 LAB
BACTERIAL VAGINOSIS DNA: NEGATIVE
CANDIDA GLABRATA DNA: NEGATIVE
CANDIDA KRUSEI DNA: NEGATIVE
CANDIDA RRNA VAG QL PROBE: POSITIVE
T VAGINALIS RRNA GENITAL QL PROBE: NEGATIVE

## 2024-04-16 RX ORDER — FLUCONAZOLE 150 MG/1
150 TABLET ORAL
Qty: 2 TABLET | Refills: 0 | Status: SHIPPED | OUTPATIENT
Start: 2024-04-16 | End: 2024-04-20

## 2024-04-23 ENCOUNTER — HOSPITAL ENCOUNTER (OUTPATIENT)
Dept: CARDIOLOGY | Facility: HOSPITAL | Age: 39
Discharge: HOME | End: 2024-04-23
Attending: INTERNAL MEDICINE
Payer: COMMERCIAL

## 2024-04-23 DIAGNOSIS — I49.3 PVC'S (PREMATURE VENTRICULAR CONTRACTIONS): ICD-10-CM

## 2024-04-23 LAB
AV INDEX (PROSTH): 0.74
AV MEAN GRADIENT: 3 MMHG
AV PEAK GRADIENT: 4 MMHG
AV VALVE AREA BY VELOCITY RATIO: 2.04 CM²
AV VALVE AREA: 1.8 CM²
AV VELOCITY RATIO: 0.84
CV ECHO LV RWT: 0.27 CM
DOP CALC AO PEAK VEL: 1.06 M/S
DOP CALC AO VTI: 24.89 CM
DOP CALC LVOT AREA: 2.4 CM2
DOP CALC LVOT DIAMETER: 1.76 CM
DOP CALC LVOT PEAK VEL: 0.89 M/S
DOP CALC LVOT STROKE VOLUME: 44.72 CM3
DOP CALC RVOT PEAK VEL: 0.96 M/S
DOP CALC RVOT VTI: 18.63 CM
DOP CALCLVOT PEAK VEL VTI: 18.39 CM
E WAVE DECELERATION TIME: 205.04 MSEC
E/A RATIO: 1.45
ECHO LV POSTERIOR WALL: 0.55 CM (ref 0.6–1.1)
EJECTION FRACTION: 55 %
FRACTIONAL SHORTENING: 34 % (ref 28–44)
INTERVENTRICULAR SEPTUM: 0.51 CM (ref 0.6–1.1)
LA MAJOR: 4.44 CM
LA MINOR: 4.46 CM
LA WIDTH: 3.41 CM
LEFT ATRIUM SIZE: 2.64 CM
LEFT ATRIUM VOLUME MOD: 35.91 CM3
LEFT ATRIUM VOLUME: 34.05 CM3
LEFT INTERNAL DIMENSION IN SYSTOLE: 2.71 CM (ref 2.1–4)
LEFT VENTRICLE DIASTOLIC VOLUME: 73.76 ML
LEFT VENTRICLE SYSTOLIC VOLUME: 27.22 ML
LEFT VENTRICULAR INTERNAL DIMENSION IN DIASTOLE: 4.09 CM (ref 3.5–6)
LEFT VENTRICULAR MASS: 57.32 G
MV A" WAVE DURATION": 7.42 MSEC
MV PEAK A VEL: 0.55 M/S
MV PEAK E VEL: 0.8 M/S
MV STENOSIS PRESSURE HALF TIME: 59.46 MS
MV VALVE AREA P 1/2 METHOD: 3.7 CM2
OHS CV RV/LV RATIO: 0.52 CM
PISA TR MAX VEL: 2.37 M/S
PULM VEIN S/D RATIO: 0.95
PV MEAN GRADIENT: 2 MMHG
PV PEAK D VEL: 0.56 M/S
PV PEAK GRADIENT: 3 MMHG
PV PEAK S VEL: 0.53 M/S
PV PEAK VELOCITY: 0.84 M/S
RA MAJOR: 3.85 CM
RA PRESSURE ESTIMATED: 3 MMHG
RA WIDTH: 2.77 CM
RIGHT VENTRICULAR END-DIASTOLIC DIMENSION: 2.13 CM
RV TB RVSP: 5 MMHG
SINUS: 2.86 CM
STJ: 2.51 CM
TR MAX PG: 22 MMHG
TRICUSPID ANNULAR PLANE SYSTOLIC EXCURSION: 2.12 CM
TV REST PULMONARY ARTERY PRESSURE: 25 MMHG

## 2024-04-23 PROCEDURE — 93306 TTE W/DOPPLER COMPLETE: CPT | Mod: 26,,, | Performed by: INTERNAL MEDICINE

## 2024-04-23 PROCEDURE — 93306 TTE W/DOPPLER COMPLETE: CPT | Mod: PO

## 2024-05-17 ENCOUNTER — OFFICE VISIT (OUTPATIENT)
Dept: OBSTETRICS AND GYNECOLOGY | Facility: CLINIC | Age: 39
End: 2024-05-17
Attending: STUDENT IN AN ORGANIZED HEALTH CARE EDUCATION/TRAINING PROGRAM
Payer: COMMERCIAL

## 2024-05-17 VITALS
SYSTOLIC BLOOD PRESSURE: 100 MMHG | WEIGHT: 106.69 LBS | BODY MASS INDEX: 19.63 KG/M2 | HEIGHT: 62 IN | DIASTOLIC BLOOD PRESSURE: 70 MMHG

## 2024-05-17 DIAGNOSIS — N76.0 VAGINITIS AND VULVOVAGINITIS: Primary | ICD-10-CM

## 2024-05-17 PROCEDURE — 3078F DIAST BP <80 MM HG: CPT | Mod: CPTII,S$GLB,, | Performed by: STUDENT IN AN ORGANIZED HEALTH CARE EDUCATION/TRAINING PROGRAM

## 2024-05-17 PROCEDURE — 3074F SYST BP LT 130 MM HG: CPT | Mod: CPTII,S$GLB,, | Performed by: STUDENT IN AN ORGANIZED HEALTH CARE EDUCATION/TRAINING PROGRAM

## 2024-05-17 PROCEDURE — 1159F MED LIST DOCD IN RCRD: CPT | Mod: CPTII,S$GLB,, | Performed by: STUDENT IN AN ORGANIZED HEALTH CARE EDUCATION/TRAINING PROGRAM

## 2024-05-17 PROCEDURE — 99999 PR PBB SHADOW E&M-EST. PATIENT-LVL III: CPT | Mod: PBBFAC,,, | Performed by: STUDENT IN AN ORGANIZED HEALTH CARE EDUCATION/TRAINING PROGRAM

## 2024-05-17 PROCEDURE — 99214 OFFICE O/P EST MOD 30 MIN: CPT | Mod: S$GLB,,, | Performed by: STUDENT IN AN ORGANIZED HEALTH CARE EDUCATION/TRAINING PROGRAM

## 2024-05-17 PROCEDURE — 3008F BODY MASS INDEX DOCD: CPT | Mod: CPTII,S$GLB,, | Performed by: STUDENT IN AN ORGANIZED HEALTH CARE EDUCATION/TRAINING PROGRAM

## 2024-05-17 RX ORDER — CLOBETASOL PROPIONATE 0.5 MG/G
OINTMENT TOPICAL DAILY
Qty: 60 G | Refills: 3 | Status: SHIPPED | OUTPATIENT
Start: 2024-05-17

## 2024-05-17 NOTE — PROGRESS NOTES
History & Physical  Gynecology      SUBJECTIVE:     Chief Complaint: Vulvar Itch (Long hx of vulvar itch )       History of Present Illness:    Pt presents for vaginal irritation. Since high school she has had itching. She did diflucan once a month, boric acid, etc  When she got older, [facility] noted  some clitoral area. Clobetasol helped then would only use when she had irritation  Got pregnant and moved to [place]. Pregnancy she felt fine. In postpartum did estrogen vaginal cream. Then when she moved back she went to Dr. Carlos again, ordered PFPT. Was diagnosed with endo at fertility  Symptoms wax and wane. Affecting sex life.   Showers, does not use scented things, but does clean directly on vulva  Takes probiotics     Review of patient's allergies indicates:  No Known Allergies    Past Medical History:   Diagnosis Date    Dyspareunia     Always    Endometriosis of uterus     Just did a receptiva test with [facility]    Infertility, female     Multiple rounds of IVF     Past Surgical History:   Procedure Laterality Date    ABDOMINAL SURGERY      Lap Estefania    ANKLE SURGERY      Left     SECTION  2018    CHOLECYSTECTOMY      HYSTEROSCOPY  3/8/24    HYSTEROSCOPY W/ POLYPECTOMY  2017    SINUS SURGERY       OB History          1    Para   1    Term   1            AB        Living   2         SAB        IAB        Ectopic        Multiple   1    Live Births   2               Family History   Problem Relation Name Age of Onset    Hypertension Father Elijah Galindo     Hypertension Mother Serina Galindo     Hyperlipidemia Mother Serina Galindo     Colon cancer Maternal Uncle      Breast cancer Neg Hx      Cancer Neg Hx      Diabetes Neg Hx      Eclampsia Neg Hx      Miscarriages / Stillbirths Neg Hx      Ovarian cancer Neg Hx       labor Neg Hx      Stroke Neg Hx       Social History     Tobacco Use    Smoking status: Never   Substance Use  Topics    Alcohol use: No    Drug use: No       Current Outpatient Medications   Medication Sig    ergocalciferol, vitamin D2, (VITAMIN D ORAL) Take by mouth.    Lactobacillus rhamnosus GG (CULTURELLE) 10 billion cell capsule Take 1 capsule by mouth once daily.    prenatal vit/iron fum/folic ac (PRENATAL 1+1 ORAL) Take by mouth.     No current facility-administered medications for this visit.         Review of Systems:  Review of Systems   Genitourinary:  Positive for dyspareunia and vaginal pain. Negative for menstrual problem, vaginal discharge, postcoital bleeding and vaginal odor.        OBJECTIVE:     Physical Exam:  Physical Exam  Vitals reviewed.   Constitutional:       General: She is not in acute distress.     Appearance: She is well-developed. She is not diaphoretic.   HENT:      Head: Normocephalic and atraumatic.   Eyes:      Conjunctiva/sclera: Conjunctivae normal.   Cardiovascular:      Rate and Rhythm: Normal rate.   Pulmonary:      Effort: Pulmonary effort is normal.   Abdominal:      General: There is no distension.      Palpations: Abdomen is soft. There is no mass.      Tenderness: There is no abdominal tenderness. There is no guarding or rebound.   Genitourinary:     Labia:         Right: No rash, tenderness, lesion or injury.         Left: No rash, tenderness, lesion or injury.       Vagina: No signs of injury and foreign body. No vaginal discharge, erythema, tenderness or bleeding.      Cervix: No cervical motion tenderness, discharge or friability.      Uterus: Not deviated, not enlarged, not fixed and not tender.       Adnexa:         Right: No mass, tenderness or fullness.          Left: No mass, tenderness or fullness.            Comments: Inflammatory like discharge noted high in vagina  Musculoskeletal:         General: Normal range of motion.      Cervical back: Normal range of motion.   Skin:     General: Skin is warm and dry.   Neurological:      Mental Status: She is alert and  oriented to person, place, and time.           ASSESSMENT:       ICD-10-CM ICD-9-CM    1. Vaginitis and vulvovaginitis  N76.0 616.10              Plan:          Vaginal discomfort  - Recommend twice weekly clobetasol to cephalad portion   - Recommend decrease cleaning directly. Recommended probiotics on AVS  - PFPT recommended  - If still an issue, consider vaginal suppositories.   - f/u 3 mo  Face to Face time with patient: 20    30 minutes of total time spent on the encounter, which includes face to face time and non-face to face time preparing to see the patient (eg, review of tests), Obtaining and/or reviewing separately obtained history, Documenting clinical information in the electronic or other health record, Independently interpreting results (not separately reported) and communicating results to the patient/family/caregiver, or Care coordination (not separately reported).      Leonor Burkett

## 2024-05-17 NOTE — PATIENT INSTRUCTIONS
Make sure the probiotic contains L. Acidophilus, L. Rhamnosus, and L. Fermentum. Suggested brands include:  - Natural Factors Ultimate Probiotic, Women's Formula  - Provella  -Cuellar Ultra FemFlora

## 2024-07-24 ENCOUNTER — OFFICE VISIT (OUTPATIENT)
Dept: OBSTETRICS AND GYNECOLOGY | Facility: CLINIC | Age: 39
End: 2024-07-24
Payer: COMMERCIAL

## 2024-07-24 VITALS
DIASTOLIC BLOOD PRESSURE: 77 MMHG | HEIGHT: 62 IN | SYSTOLIC BLOOD PRESSURE: 121 MMHG | BODY MASS INDEX: 19.47 KG/M2 | WEIGHT: 105.81 LBS

## 2024-07-24 DIAGNOSIS — N64.4 BREAST PAIN: Primary | ICD-10-CM

## 2024-07-24 DIAGNOSIS — N89.8 VAGINAL IRRITATION: ICD-10-CM

## 2024-07-24 PROCEDURE — 99999 PR PBB SHADOW E&M-EST. PATIENT-LVL IV: CPT | Mod: PBBFAC,,, | Performed by: NURSE PRACTITIONER

## 2024-07-24 PROCEDURE — 3078F DIAST BP <80 MM HG: CPT | Mod: CPTII,S$GLB,, | Performed by: NURSE PRACTITIONER

## 2024-07-24 PROCEDURE — 81514 NFCT DS BV&VAGINITIS DNA ALG: CPT | Performed by: NURSE PRACTITIONER

## 2024-07-24 PROCEDURE — 1159F MED LIST DOCD IN RCRD: CPT | Mod: CPTII,S$GLB,, | Performed by: NURSE PRACTITIONER

## 2024-07-24 PROCEDURE — 3008F BODY MASS INDEX DOCD: CPT | Mod: CPTII,S$GLB,, | Performed by: NURSE PRACTITIONER

## 2024-07-24 PROCEDURE — 99213 OFFICE O/P EST LOW 20 MIN: CPT | Mod: S$GLB,,, | Performed by: NURSE PRACTITIONER

## 2024-07-24 PROCEDURE — 3074F SYST BP LT 130 MM HG: CPT | Mod: CPTII,S$GLB,, | Performed by: NURSE PRACTITIONER

## 2024-07-24 NOTE — PROGRESS NOTES
Chief Complaint: Breast pain     HPI:      Brielle is a 38 y.o.  who presents today for persistent left breast pain.      Menses are regular. Patient's last menstrual period was 2024. She does not notice that the breast pain is related to her cycles.     Left breast pain started around 3/2024. Located in upper outer quadrant. Pain comes and goes. Feels like a dull tightness. No alleviating or aggravating factors.     1 soda/day. 1 cup of caffeine in the AM.  Not a smoker.    In 10/2024, she had a bad URI, had chest x ray at that time which was fine   Had persistent cough after this which has improved.   Then started having heart palpitations in -2024. [place] [facility] [place]ter monitor that showed PVCs. Then saw Cardiology with [facility] and did a echo which was unremarkable.   She is still having occasional heart palpations.     She is seeing [facility].   Diagnosed with endometriosis   Planning to be on Lupron for 2 months, has not started, then will attempt embryo transfer    She has chronic vulvovaginitis  She recently saw Dr. Burkett - recommended Clobetasol - has been using   VVC (2024)  She had a tampon in place over the past week and is concerned some of the tampon is retained - she has also noticed some vaginal irritation after having tampon in place     Brielle is currently sexually active with a single male partner.     Previous Pap: NILM, HPV negative (3/28/2024)  Previous Mammogram: BiRads: 1 (3/2024)       Past Medical History:   Diagnosis Date    Dyspareunia     Always    Endometriosis of uterus     Just did a [facility] test [facility]    Infertility, female     Multiple rounds of IVF       Current Outpatient Medications:     clobetasol 0.05% (TEMOVATE) 0.05 % Oint, Apply topically once daily., Disp: 60 g, Rfl: 3    ergocalciferol, vitamin D2, (VITAMIN D ORAL), Take by mouth., Disp: , Rfl:     Lactobacillus rhamnosus GG (CULTURELLE) 10 billion cell  "capsule, Take 1 capsule by mouth once daily., Disp: , Rfl:     prenatal vit/iron fum/folic ac (PRENATAL 1+1 ORAL), Take by mouth., Disp: , Rfl:    Review of patient's allergies indicates:  No Known Allergies  Past Surgical History:   Procedure Laterality Date    ABDOMINAL SURGERY  2008    Lap Estefania    ANKLE SURGERY      Left     SECTION  2018    CHOLECYSTECTOMY      HYSTEROSCOPY  3/8/24    HYSTEROSCOPY W/ POLYPECTOMY  2017    SINUS SURGERY       Social History     Tobacco Use    Smoking status: Never   Substance Use Topics    Alcohol use: No    Drug use: No     Family History   Problem Relation Name Age of Onset    Hypertension Father Elijah Galindo     Hypertension Mother Serina Galindo     Hyperlipidemia Mother Serina Galindo     Colon cancer Maternal Uncle      Breast cancer Neg Hx      Cancer Neg Hx      Diabetes Neg Hx      Eclampsia Neg Hx      Miscarriages / Stillbirths Neg Hx      Ovarian cancer Neg Hx       labor Neg Hx      Stroke Neg Hx         Physical Exam:      PHYSICAL EXAM:  /77   Ht 5' 2" (1.575 m)   Wt 48 kg (105 lb 13.1 oz)   LMP 2024   BMI 19.35 kg/m²   Body mass index is 19.35 kg/m².     APPEARANCE: Well nourished, well developed, in no acute distress.  BREASTS: Symmetrical, no visible skin lesions. No palpable masses. No nipple discharge bilaterally.  PELVIC:  Smooth, thin, hyperemic tissue noted surrounding anterior aspect of vaginal introitus specifically around inner labia minora and urethra. Vagina without lesions, without discharge, without erythema, without ulcers.  Cervix without cervical motion tenderness, non-friable. Bimanual exam deferred.       Assessment/Plan:     Breast pain  -     Ambulatory referral/consult to Breast Surgery; Future; Expected date: 2024  -     Ambulatory referral/consult to Internal Medicine; Future; Expected date: 2024    Vaginal irritation  -     Vaginosis Screen by DNA Probe      PLAN:    Here today due to " persistent left breast pain with recent negative breast ultrasound.   Referral to Breast Surgery for further evaluation and management  Referral to establish with PCP and to evaluate for other etiology of pain  Discussed recommendations for breast pain including:   Wear a supportive bra   Don't smoke  Decrease caffeine intake   Decrease dietary fat  Increase fiber intake   Evening Primrose Oil (EPO) 1.5 gm by mouth twice a day   Weight loss to attain BMI <26   Affirm collected. Encouraged Brielle to continue to follow up with Dr. Burkett for further evaluation and management of her chronic vulvovaginitis.    Follow up if symptoms worsen or fail to improve.

## 2024-07-24 NOTE — PATIENT INSTRUCTIONS
Discussed recommendations for breast pain including:   Wear a supportive bra   Don't smoke  Decrease caffeine intake   Decrease dietary fat  Increase fiber intake   Evening Primrose Oil (EPO) 1.5 gm by mouth twice a day   Weight loss to attain BMI <26

## 2024-07-26 LAB
BACTERIAL VAGINOSIS DNA: NEGATIVE
CANDIDA GLABRATA DNA: NEGATIVE
CANDIDA KRUSEI DNA: NEGATIVE
CANDIDA RRNA VAG QL PROBE: NEGATIVE
T VAGINALIS RRNA GENITAL QL PROBE: NEGATIVE

## 2024-08-08 ENCOUNTER — OFFICE VISIT (OUTPATIENT)
Dept: FAMILY MEDICINE | Facility: CLINIC | Age: 39
End: 2024-08-08
Payer: COMMERCIAL

## 2024-08-08 VITALS
WEIGHT: 106.25 LBS | HEART RATE: 85 BPM | HEIGHT: 62 IN | SYSTOLIC BLOOD PRESSURE: 104 MMHG | BODY MASS INDEX: 19.55 KG/M2 | OXYGEN SATURATION: 100 % | DIASTOLIC BLOOD PRESSURE: 70 MMHG | TEMPERATURE: 98 F

## 2024-08-08 DIAGNOSIS — H81.10 BENIGN PAROXYSMAL POSITIONAL VERTIGO, UNSPECIFIED LATERALITY: ICD-10-CM

## 2024-08-08 DIAGNOSIS — R42 DIZZINESS: ICD-10-CM

## 2024-08-08 DIAGNOSIS — Z76.89 ENCOUNTER TO ESTABLISH CARE WITH NEW DOCTOR: Primary | ICD-10-CM

## 2024-08-08 PROCEDURE — 99999 PR PBB SHADOW E&M-EST. PATIENT-LVL IV: CPT | Mod: PBBFAC,,, | Performed by: FAMILY MEDICINE

## 2024-08-13 ENCOUNTER — LAB VISIT (OUTPATIENT)
Dept: LAB | Facility: HOSPITAL | Age: 39
End: 2024-08-13
Attending: FAMILY MEDICINE
Payer: COMMERCIAL

## 2024-08-13 DIAGNOSIS — R42 DIZZINESS: ICD-10-CM

## 2024-08-13 DIAGNOSIS — Z76.89 ENCOUNTER TO ESTABLISH CARE WITH NEW DOCTOR: ICD-10-CM

## 2024-08-13 LAB
ALBUMIN SERPL BCP-MCNC: 4.3 G/DL (ref 3.5–5.2)
ALP SERPL-CCNC: 35 U/L (ref 55–135)
ALT SERPL W/O P-5'-P-CCNC: 12 U/L (ref 10–44)
ANION GAP SERPL CALC-SCNC: 5 MMOL/L (ref 8–16)
AST SERPL-CCNC: 13 U/L (ref 10–40)
BASOPHILS # BLD AUTO: 0.05 K/UL (ref 0–0.2)
BASOPHILS NFR BLD: 0.9 % (ref 0–1.9)
BILIRUB SERPL-MCNC: 0.8 MG/DL (ref 0.1–1)
BUN SERPL-MCNC: 11 MG/DL (ref 6–20)
CALCIUM SERPL-MCNC: 9.4 MG/DL (ref 8.7–10.5)
CHLORIDE SERPL-SCNC: 109 MMOL/L (ref 95–110)
CHOLEST SERPL-MCNC: 181 MG/DL (ref 120–199)
CHOLEST/HDLC SERPL: 3.2 {RATIO} (ref 2–5)
CO2 SERPL-SCNC: 24 MMOL/L (ref 23–29)
CREAT SERPL-MCNC: 0.8 MG/DL (ref 0.5–1.4)
DIFFERENTIAL METHOD BLD: NORMAL
EOSINOPHIL # BLD AUTO: 0.3 K/UL (ref 0–0.5)
EOSINOPHIL NFR BLD: 6.2 % (ref 0–8)
ERYTHROCYTE [DISTWIDTH] IN BLOOD BY AUTOMATED COUNT: 12.2 % (ref 11.5–14.5)
EST. GFR  (NO RACE VARIABLE): >60 ML/MIN/1.73 M^2
ESTIMATED AVG GLUCOSE: 100 MG/DL (ref 68–131)
GLUCOSE SERPL-MCNC: 91 MG/DL (ref 70–110)
HBA1C MFR BLD: 5.1 % (ref 4–5.6)
HCT VFR BLD AUTO: 37.2 % (ref 37–48.5)
HDLC SERPL-MCNC: 57 MG/DL (ref 40–75)
HDLC SERPL: 31.5 % (ref 20–50)
HGB BLD-MCNC: 12.4 G/DL (ref 12–16)
IMM GRANULOCYTES # BLD AUTO: 0.02 K/UL (ref 0–0.04)
IMM GRANULOCYTES NFR BLD AUTO: 0.4 % (ref 0–0.5)
LDLC SERPL CALC-MCNC: 113.6 MG/DL (ref 63–159)
LYMPHOCYTES # BLD AUTO: 1.3 K/UL (ref 1–4.8)
LYMPHOCYTES NFR BLD: 24.7 % (ref 18–48)
MCH RBC QN AUTO: 30.4 PG (ref 27–31)
MCHC RBC AUTO-ENTMCNC: 33.3 G/DL (ref 32–36)
MCV RBC AUTO: 91 FL (ref 82–98)
MONOCYTES # BLD AUTO: 0.4 K/UL (ref 0.3–1)
MONOCYTES NFR BLD: 7.5 % (ref 4–15)
NEUTROPHILS # BLD AUTO: 3.2 K/UL (ref 1.8–7.7)
NEUTROPHILS NFR BLD: 60.3 % (ref 38–73)
NONHDLC SERPL-MCNC: 124 MG/DL
NRBC BLD-RTO: 0 /100 WBC
PLATELET # BLD AUTO: 164 K/UL (ref 150–450)
PMV BLD AUTO: 10.5 FL (ref 9.2–12.9)
POTASSIUM SERPL-SCNC: 4.1 MMOL/L (ref 3.5–5.1)
PROT SERPL-MCNC: 7 G/DL (ref 6–8.4)
RBC # BLD AUTO: 4.08 M/UL (ref 4–5.4)
SODIUM SERPL-SCNC: 138 MMOL/L (ref 136–145)
TRIGL SERPL-MCNC: 52 MG/DL (ref 30–150)
TSH SERPL DL<=0.005 MIU/L-ACNC: 0.96 UIU/ML (ref 0.4–4)
WBC # BLD AUTO: 5.31 K/UL (ref 3.9–12.7)

## 2024-08-13 PROCEDURE — 36415 COLL VENOUS BLD VENIPUNCTURE: CPT | Performed by: FAMILY MEDICINE

## 2024-08-13 PROCEDURE — 84443 ASSAY THYROID STIM HORMONE: CPT | Performed by: FAMILY MEDICINE

## 2024-08-13 PROCEDURE — 80053 COMPREHEN METABOLIC PANEL: CPT | Performed by: FAMILY MEDICINE

## 2024-08-13 PROCEDURE — 80061 LIPID PANEL: CPT | Performed by: FAMILY MEDICINE

## 2024-08-13 PROCEDURE — 83036 HEMOGLOBIN GLYCOSYLATED A1C: CPT | Performed by: FAMILY MEDICINE

## 2024-08-13 PROCEDURE — 85025 COMPLETE CBC W/AUTO DIFF WBC: CPT | Performed by: FAMILY MEDICINE

## 2024-09-16 ENCOUNTER — OFFICE VISIT (OUTPATIENT)
Dept: SURGERY | Facility: CLINIC | Age: 39
End: 2024-09-16
Payer: COMMERCIAL

## 2024-09-16 VITALS
HEIGHT: 62 IN | WEIGHT: 106 LBS | DIASTOLIC BLOOD PRESSURE: 80 MMHG | HEART RATE: 97 BPM | BODY MASS INDEX: 19.51 KG/M2 | SYSTOLIC BLOOD PRESSURE: 120 MMHG

## 2024-09-16 DIAGNOSIS — N64.4 BREAST PAIN: ICD-10-CM

## 2024-09-16 DIAGNOSIS — Z12.31 SCREENING MAMMOGRAM, ENCOUNTER FOR: Primary | ICD-10-CM

## 2024-09-16 PROCEDURE — 3074F SYST BP LT 130 MM HG: CPT | Mod: CPTII,S$GLB,, | Performed by: PHYSICIAN ASSISTANT

## 2024-09-16 PROCEDURE — 3008F BODY MASS INDEX DOCD: CPT | Mod: CPTII,S$GLB,, | Performed by: PHYSICIAN ASSISTANT

## 2024-09-16 PROCEDURE — 99203 OFFICE O/P NEW LOW 30 MIN: CPT | Mod: S$GLB,,, | Performed by: PHYSICIAN ASSISTANT

## 2024-09-16 PROCEDURE — 3044F HG A1C LEVEL LT 7.0%: CPT | Mod: CPTII,S$GLB,, | Performed by: PHYSICIAN ASSISTANT

## 2024-09-16 PROCEDURE — 1160F RVW MEDS BY RX/DR IN RCRD: CPT | Mod: CPTII,S$GLB,, | Performed by: PHYSICIAN ASSISTANT

## 2024-09-16 PROCEDURE — 99999 PR PBB SHADOW E&M-EST. PATIENT-LVL III: CPT | Mod: PBBFAC,,, | Performed by: PHYSICIAN ASSISTANT

## 2024-09-16 PROCEDURE — 1159F MED LIST DOCD IN RCRD: CPT | Mod: CPTII,S$GLB,, | Performed by: PHYSICIAN ASSISTANT

## 2024-09-16 PROCEDURE — 3079F DIAST BP 80-89 MM HG: CPT | Mod: CPTII,S$GLB,, | Performed by: PHYSICIAN ASSISTANT

## 2024-09-16 NOTE — PROGRESS NOTES
[facility]  Department of Surgery    REFERRING:  Anh Bustamante, NP  [address] [place] [address]  PCP: Mary Brown MD  CHIEF COMPLAINT: left breast pain    Subjective:      Brielle Sharpe is a 38 y.o. premenopausal female referred for evaluation of breast pain. Change was noted a few months ago. Patient states the pain was focal to the UOQ of her breast. She was seen by her OBGYN who ordered an US. US performed 24 which was benign. Patient is currently being seen at [facility] and taking Lupron. Planning embryo transfer in November. Patient does routinely do self breast exams.  Patient has noted a change on breast exam.  Patient denies nipple discharge. Patient denies to previous breast biopsy. Patient denies a personal history of breast cancer.    GYN History:  Age of menarche was 12. Patient is . Age of first live birth was 32.      FAMILY history:  Denies significant family history of breast cancer. Does endorse Maternal Uncle with colon cancer.     Past Medical History:   Diagnosis Date    Dyspareunia     Always    Endometriosis of uterus     Just did a recept[facility] test with [facility]    Infertility, female     Multiple rounds of IVF     Past Surgical History:   Procedure Laterality Date    ABDOMINAL SURGERY      Lap Estefania    ANKLE SURGERY      Left     SECTION  2018    CHOLECYSTECTOMY      HYSTEROSCOPY  3/8/24    HYSTEROSCOPY W/ POLYPECTOMY  2017    SINUS SURGERY       Current Outpatient Medications on File Prior to Visit   Medication Sig Dispense Refill    clobetasol 0.05% (TEMOVATE) 0.05 % Oint Apply topically once daily. 60 g 3    ergocalciferol, vitamin D2, (VITAMIN D ORAL) Take by mouth.      Lactobacillus rhamnosus GG (CULTURELLE) 10 billion cell capsule Take 1 capsule by mouth once daily.      prenatal vit/iron fum/folic ac (PRENATAL 1+1 ORAL) Take by mouth.       No current facility-administered medications on file prior to visit.      Social History     Socioeconomic History    Marital status:    Tobacco Use    Smoking status: Never    Smokeless tobacco: Never   Substance and Sexual Activity    Alcohol use: No    Drug use: No    Sexual activity: Yes     Partners: Male     Birth control/protection: Abstinence, None     Social Determinants of Health     Financial Resource Strain: Low Risk  (2024)    Overall Financial Resource Strain (CARDIA)     Difficulty of Paying Living Expenses: Not hard at all   Food Insecurity: No Food Insecurity (2024)    Hunger Vital Sign     Worried About Running Out of Food in the Last Year: Never true     Ran Out of Food in the Last Year: Never true   Transportation Needs: No Transportation Needs (2023)    Received from [facility]    PRAPARE - Transportation     Lack of Transportation (Medical): No     Lack of Transportation (Non-Medical): No   Physical Activity: Inactive (2024)    Exercise Vital Sign     Days of Exercise per Week: 0 days     Minutes of Exercise per Session: 0 min   Stress: No Stress Concern Present (2024)    [employer] - Occupational Stress Questionnaire     Feeling of Stress : Only a little   Housing Stability: Unknown (2024)    Housing Stability Vital Sign     Unable to Pay for Housing in the Last Year: No     Family History   Problem Relation Name Age of Onset    Hypertension Father Elijah Galindo     Hypertension Mother Serina Galindo     Hyperlipidemia Mother Serina Galindo     Colon cancer Maternal Uncle      Breast cancer Neg Hx      Cancer Neg Hx      Diabetes Neg Hx      Eclampsia Neg Hx      Miscarriages / Stillbirths Neg Hx      Ovarian cancer Neg Hx       labor Neg Hx      Stroke Neg Hx         Review of Systems  Review of Systems   Constitutional:  Negative for chills, fever and malaise/fatigue.   HENT:  Negative for congestion.    Eyes:  Negative for discharge.   Respiratory:  Negative for cough,  "shortness of breath and stridor.    Cardiovascular:  Negative for chest pain and palpitations.   Gastrointestinal:  Negative for abdominal pain and nausea.   Neurological:  Negative for headaches.   Psychiatric/Behavioral:  The patient is not nervous/anxious.           Objective:        /80   Pulse 97   Ht 5' 2" (1.575 m)   Wt 48.1 kg (106 lb)   BMI 19.39 kg/m²   Physical Exam   Vitals reviewed.  Constitutional: She is oriented to person, place, and time.   HENT:   Head: Normocephalic and atraumatic.   Nose: Nose normal.   Eyes: Pupils are equal, round, and reactive to light. Right eye exhibits no discharge. Left eye exhibits no discharge.   Pulmonary/Chest: Effort normal and breath sounds normal. No stridor. No respiratory distress. She exhibits no mass, no tenderness and no edema. Right breast exhibits no inverted nipple, no mass, no nipple discharge, no skin change and no tenderness. Left breast exhibits no inverted nipple, no mass, no nipple discharge, no skin change and no tenderness. No breast swelling or bleeding. Breasts are symmetrical.   Abdominal: Normal appearance.   Genitourinary: No breast swelling or bleeding.   Neurological: She is alert and oriented to person, place, and time.   Skin: Skin is warm and dry.     Psychiatric: Her behavior is normal. Mood, judgment and thought content normal.     Radiology review: Images personally reviewed by me in the clinic.      4/12/24 US Left Breast Limited:     Findings:  Targeted ultrasound of the area the patient pinpointed as the area of concern was performed.  No abnormalities are present.        Impression:  No sonographic evidence of abnormality. A benign or negative imaging result should not preclude further clinical investigation of clinically suspicious symptoms.       BI-RADS Category 1: Negative Finding     Recommendation:  Annual mammogram is recommended beginning at age 40.      Assessment:      Brielle Sharpe is a 38 y.o. premenopausal female " with persistent left breast pain.      Plan:   We discussed the options for management of breast pain.    Discussed continue with OTC therapies such as acetaminophen or nonsteroidal anti-inflammatory drugs (NSAIDs). They can both be used to relieve breast pain. Topical NSAIDS such as OTC Diclofenac (Volteran) gel can be used. Other recommendations include use of a supportive bra. Wearing a soft, supportive bra at night prevents the breasts from pulling down on the chest wall. Some women obtain relief from application of warm compresses or ice packs. Also, referred for professional fitting of a supportive bra.      Discussed lifestyle recommendations such as decrease or elimination of caffeine. Also low-fat diet, high complex carbohydrate diet has been shown to be effective.       Alternative therapies such as Evening Primerose Oil (EPO) 1000mg twice daily has been found to be helpful for some patients, results are seen after 3-6 months.    Will proceed with imaging now to evaluate. Radiology will coordinate left diagnostic mammogram.     The patient is in agreement with the plan. Questions were encouraged and answered to patient's satisfaction. Brielle  will call our office with any questions or concerns.

## 2024-10-10 ENCOUNTER — OFFICE VISIT (OUTPATIENT)
Dept: INTERNAL MEDICINE | Facility: CLINIC | Age: 39
End: 2024-10-10
Payer: COMMERCIAL

## 2024-10-10 VITALS
SYSTOLIC BLOOD PRESSURE: 92 MMHG | DIASTOLIC BLOOD PRESSURE: 62 MMHG | HEIGHT: 62 IN | WEIGHT: 102.5 LBS | OXYGEN SATURATION: 99 % | BODY MASS INDEX: 18.86 KG/M2 | HEART RATE: 99 BPM
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
## 2024-10-10 DIAGNOSIS — M25.551 RIGHT HIP PAIN: ICD-10-CM

## 2024-10-10 DIAGNOSIS — B34.9 VIRAL SYNDROME: Primary | ICD-10-CM

## 2024-10-10 DIAGNOSIS — S16.1XXA STRAIN OF NECK MUSCLE, INITIAL ENCOUNTER: ICD-10-CM

## 2024-10-10 DIAGNOSIS — M54.2 NECK PAIN: ICD-10-CM

## 2024-10-10 LAB
CTP QC/QA: YES
POC MOLECULAR INFLUENZA A AGN: NEGATIVE
POC MOLECULAR INFLUENZA B AGN: NEGATIVE
S PYO RRNA THROAT QL PROBE: NEGATIVE
SARS-COV-2 RDRP RESP QL NAA+PROBE: NEGATIVE

## 2024-10-10 PROCEDURE — 99999 PR PBB SHADOW E&M-EST. PATIENT-LVL IV: CPT | Mod: PBBFAC,,, | Performed by: PHYSICIAN ASSISTANT

## 2024-10-10 RX ORDER — LETROZOLE 2.5 MG/1
5 TABLET, FILM COATED ORAL
COMMUNITY
Start: 2024-09-03

## 2024-10-10 RX ORDER — ONDANSETRON 4 MG/1
4 TABLET, ORALLY DISINTEGRATING ORAL EVERY 8 HOURS PRN
Qty: 30 TABLET | Refills: 0 | Status: SHIPPED | OUTPATIENT
Start: 2024-10-10

## 2024-10-10 RX ORDER — LEUPROLIDE ACETATE 3.75 MG
KIT INTRAMUSCULAR
COMMUNITY
Start: 2024-09-13

## 2024-10-10 RX ORDER — METHYLPREDNISOLONE 4 MG/1
TABLET ORAL
Qty: 21 EACH | Refills: 0 | Status: SHIPPED | OUTPATIENT
Start: 2024-10-10 | End: 2024-10-31

## 2024-10-10 RX ORDER — PROMETHAZINE HYDROCHLORIDE AND DEXTROMETHORPHAN HYDROBROMIDE 6.25; 15 MG/5ML; MG/5ML
5 SYRUP ORAL EVERY 4 HOURS PRN
Qty: 118 ML | Refills: 0 | Status: SHIPPED | OUTPATIENT
Start: 2024-10-10 | End: 2024-10-20

## 2024-10-10 RX ORDER — AZITHROMYCIN 250 MG/1
TABLET, FILM COATED ORAL
Qty: 6 TABLET | Refills: 0 | Status: SHIPPED | OUTPATIENT
Start: 2024-10-10 | End: 2024-10-15

## 2024-10-10 NOTE — PROGRESS NOTES
INTERNAL MEDICINE URGENT VISIT NOTE    CHIEF COMPLAINT     Chief Complaint   Patient presents with    Fever       HPI     Brielle Sharpe is a 39 y.o. female who presents for an urgent visit today.    PCP is Mary Brown MD, patient is new to me.     Patient presents with complaints of constellation of symptoms mainly cough and fever with some vomiting that started last night.   Symptoms have been present for about 2 days but admits that she has been skin off and on for about one month.    Had COVID on 9/7/2024 -mild symptoms   Kids with similar symptoms for the past month (ear infections, URI)  No SOB or chest pain   No lower GI symptoms   Took a Zofran and OTC tylenol   Feeling ok today       10:18 AM   reports to me that patient fell upon entering clinic. She did not report this to me during our eval. I called the patient to ask about pain and about the slip and fall, she reports that she is having some mild pain. She would like to come back for an eval. She will return to clinic for an inperson exam.   Of note she ambulated in and out of office without difficulty.     Neck pain and right hip pain reported after mechanical slip and fall   Reports that she slipped on water on the floor. Fell to the ground in split like fashion, extending left leg. Was able to get up on her own.   Delayed onset of pain.   She has no pain with walking   She has mild right hip TTP   She has no deformity   And normal gait   Offered imaging and muscle relaxer, she politely declined. Says I'm ok, I'll let you know if anything worsens.     Past Medical History:  Past Medical History:   Diagnosis Date    Dyspareunia     Always    Endometriosis of uterus     Just did a receptiva test with [facility]    Infertility, female     Multiple rounds of IVF       Home Medications:  Prior to Admission medications    Medication Sig Start Date End Date Taking? Authorizing Provider   clobetasol 0.05% (TEMOVATE) 0.05 % Oint Apply  "topically once daily. 5/17/24   EastonLeonor MD   ergocalciferol, vitamin D2, (VITAMIN D ORAL) Take by mouth.    Provider, Historical   Lactobacillus rhamnosus GG (CULTURELLE) 10 billion cell capsule Take 1 capsule by mouth once daily.    Provider, Historical   prenatal vit/iron fum/folic ac (PRENATAL 1+1 ORAL) Take by mouth.    Provider, Historical       Review of Systems:  Review of Systems   Constitutional:  Negative for chills and fever.   HENT:  Positive for congestion. Negative for sore throat and trouble swallowing.    Eyes:  Negative for visual disturbance.   Respiratory:  Positive for cough. Negative for shortness of breath.    Cardiovascular:  Negative for chest pain.   Gastrointestinal:  Negative for abdominal pain, constipation, diarrhea, nausea and vomiting.   Genitourinary:  Negative for dysuria and flank pain.   Musculoskeletal:  Negative for back pain, neck pain and neck stiffness.        Points to right hip and right side of posterior neck -pain    Skin:  Negative for rash.   Neurological:  Negative for dizziness, syncope, weakness and headaches.   Psychiatric/Behavioral:  Negative for confusion.        Health Maintainence:   Immunizations:  Health Maintenance         Date Due Completion Date    Influenza Vaccine (1) 09/01/2024 10/15/2010    COVID-19 Vaccine (1 - 2024-25 season) Never done ---    TETANUS VACCINE 08/08/2025 (Originally 9/29/2003) ---    Cervical Cancer Screening 03/25/2029 3/25/2024    RSV Vaccine (Age 60+ and Pregnant patients) (1 - 1-dose 75+ series) 09/29/2060 ---             PHYSICAL EXAM     BP 92/62   Pulse 99   Ht 5' 2" (1.575 m)   Wt 46.5 kg (102 lb 8.2 oz)   LMP  (LMP Unknown)   SpO2 99%   BMI 18.75 kg/m²     Physical Exam  Vitals and nursing note reviewed.   Constitutional:       Appearance: Normal appearance.      Comments: Healthy appearing female in NAD or apparent pain. She makes good eye contact, speaks in clear full sentences and ambulates with ease.      "   HENT:      Head: Normocephalic and atraumatic.      Nose: Nose normal.      Mouth/Throat:      Pharynx: Oropharynx is clear.   Eyes:      Conjunctiva/sclera: Conjunctivae normal.   Cardiovascular:      Rate and Rhythm: Normal rate and regular rhythm.      Pulses: Normal pulses.   Pulmonary:      Effort: No respiratory distress.   Abdominal:      Tenderness: There is no abdominal tenderness.   Musculoskeletal:         General: Normal range of motion.      Cervical back: No rigidity.      Comments: No C T or L midline bony TTP crepitus ors step-offs  No deformity    Skin:     General: Skin is warm and dry.      Capillary Refill: Capillary refill takes less than 2 seconds.      Findings: No rash.   Neurological:      General: No focal deficit present.      Mental Status: She is alert.      Gait: Gait normal.   Psychiatric:         Mood and Affect: Mood normal.         LABS     Lab Results   Component Value Date    HGBA1C 5.1 08/13/2024     CMP  Sodium   Date Value Ref Range Status   08/13/2024 138 136 - 145 mmol/L Final     Potassium   Date Value Ref Range Status   08/13/2024 4.1 3.5 - 5.1 mmol/L Final     Chloride   Date Value Ref Range Status   08/13/2024 109 95 - 110 mmol/L Final     CO2   Date Value Ref Range Status   08/13/2024 24 23 - 29 mmol/L Final     Glucose   Date Value Ref Range Status   08/13/2024 91 70 - 110 mg/dL Final     BUN   Date Value Ref Range Status   08/13/2024 11 6 - 20 mg/dL Final     Creatinine   Date Value Ref Range Status   08/13/2024 0.8 0.5 - 1.4 mg/dL Final     Calcium   Date Value Ref Range Status   08/13/2024 9.4 8.7 - 10.5 mg/dL Final     Total Protein   Date Value Ref Range Status   08/13/2024 7.0 6.0 - 8.4 g/dL Final     Albumin   Date Value Ref Range Status   08/13/2024 4.3 3.5 - 5.2 g/dL Final     Total Bilirubin   Date Value Ref Range Status   08/13/2024 0.8 0.1 - 1.0 mg/dL Final     Comment:     For infants and newborns, interpretation of results should be based  on gestational  age, weight and in agreement with clinical  observations.    Premature Infant recommended reference ranges:  Up to 24 hours.............<8.0 mg/dL  Up to 48 hours............<12.0 mg/dL  3-5 days..................<15.0 mg/dL  6-29 days.................<15.0 mg/dL       Alkaline Phosphatase   Date Value Ref Range Status   08/13/2024 35 (L) 55 - 135 U/L Final     AST   Date Value Ref Range Status   08/13/2024 13 10 - 40 U/L Final     ALT   Date Value Ref Range Status   08/13/2024 12 10 - 44 U/L Final     Anion Gap   Date Value Ref Range Status   08/13/2024 5 (L) 8 - 16 mmol/L Final     Lab Results   Component Value Date    WBC 5.31 08/13/2024    HGB 12.4 08/13/2024    HCT 37.2 08/13/2024    MCV 91 08/13/2024     08/13/2024     Lab Results   Component Value Date    CHOL 181 08/13/2024     Lab Results   Component Value Date    HDL 57 08/13/2024     Lab Results   Component Value Date    LDLCALC 113.6 08/13/2024     Lab Results   Component Value Date    TRIG 52 08/13/2024     Lab Results   Component Value Date    CHOLHDL 31.5 08/13/2024     Lab Results   Component Value Date    TSH 0.957 08/13/2024       ASSESSMENT/PLAN     Brielle Sharpe is a 39 y.o. female     Brielle was seen today for fever. Offered medication regimen to manage symptoms at home. 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 All in office screens were negative.   Pertaining to slip and fall, she has point localized pain to right hip and right side of posterior neck. Nothing reproducible on my exam. Offered imaging and PO meds, robaxin, she politely declined. I asked that she let me know if any symptoms change or worsen, offered PT, ortho referral, imaging and medication mgmt if needed. She verbalized understanding and ambulated out of the office with ease.     Diagnoses and all orders for this visit:    Viral syndrome  -     POCT COVID-19 Rapid Screening  -     POCT Influenza A/B Molecular  -      POCT Rapid Strep A    Right hip pain    Neck pain    Strain of neck muscle, initial encounter    Fall on same level from slipping, tripping or stumbling, initial encounter    Other orders  -     methylPREDNISolone (MEDROL DOSEPACK) 4 mg tablet; use as directed  -     azithromycin (Z-THERESE) 250 MG tablet; Take 2 tablets by mouth on day 1; Take 1 tablet by mouth on days 2-5  -     promethazine-dextromethorphan (PROMETHAZINE-DM) 6.25-15 mg/5 mL Syrp; Take 5 mLs by mouth every 4 (four) hours as needed (cough).  -     ondansetron (ZOFRAN-ODT) 4 MG TbDL; Take 1 tablet (4 mg total) by mouth every 8 (eight) hours as needed (nausea).      Patient was counseled on when and how to seek emergent care.       Nancy Kim PA-C   Department of Internal Medicine - [facility]   8:45 AM

## 2024-10-10 NOTE — PATIENT INSTRUCTIONS
I suspect you have a right hip contusion and likely cervical strain and possibly lumbar strain after your slip and fall today in the clinic. You decided you didn't need imaging but please if you change your mind or if your symptoms worsen to any degree, let me know and I will get images ordered for you. We discussed the utility of non-sedating muscle relaxer, this can help with cervical muscle strain and spasm. Please let me know if you would like a prescription for this. Taking OTC NSAIDs can be helpful with decreasing inflammation caused by a slip and fall.     Thank you for allowing me to participate in your health care.   Please let me know if you have any questions or concerns.   Nancy Kim PA-C

## 2024-10-14 ENCOUNTER — PATIENT MESSAGE (OUTPATIENT)
Dept: INTERNAL MEDICINE | Facility: CLINIC | Age: 39
End: 2024-10-14
Payer: COMMERCIAL

## 2024-12-16 ENCOUNTER — CLINICAL SUPPORT (OUTPATIENT)
Dept: OBSTETRICS AND GYNECOLOGY | Facility: CLINIC | Age: 39
End: 2024-12-16
Payer: COMMERCIAL

## 2024-12-16 ENCOUNTER — PATIENT MESSAGE (OUTPATIENT)
Dept: OBSTETRICS AND GYNECOLOGY | Facility: CLINIC | Age: 39
End: 2024-12-16

## 2024-12-16 DIAGNOSIS — N91.2 AMENORRHEA: Primary | ICD-10-CM

## 2024-12-16 PROCEDURE — 99999 PR PBB SHADOW E&M-EST. PATIENT-LVL II: CPT | Mod: PBBFAC,,,

## 2024-12-16 RX ORDER — NAPROXEN SODIUM 220 MG/1
81 TABLET, FILM COATED ORAL DAILY
COMMUNITY

## 2024-12-16 RX ORDER — CHOLECALCIFEROL (VITAMIN D3) 25 MCG
1000 TABLET ORAL DAILY
COMMUNITY

## 2024-12-16 NOTE — PROGRESS NOTES
Spoke with patient for a total of 30 minutes during virtual visit.  Updated chart to reflect up to date patient demographics.  Allergies, medications, pharmacy, medical/family history and OB history updated.  Patient was guided through expectations of care during pregnancy.  Pregnancy confirmation, dating u/s & first routine OB appts scheduled.  Education provided & questions answered. Encouraged to send message or call office with any questions/concerns. Verbalized understanding.     Discussed with pt:    Lmp unsure; had frozen embryo transfer on 11/12 at [facility]  taking PNV   C/o nausea/no vomiting  denies cramping/has occ light spotting  Precautions discussed  Referred to [email or web address] for Preg A to Z guide & class schedule   Discussed benefits of breastfeeding - plans to breastfeed   Discussed need for pediatrician  Last appt at [facility] will be around 10wks  Taking ASA 81mg & lovenox   AMA  Hx twins  Asking about flu vaccine

## 2025-01-07 ENCOUNTER — DOCUMENTATION ONLY (OUTPATIENT)
Dept: OBSTETRICS AND GYNECOLOGY | Facility: CLINIC | Age: 40
End: 2025-01-07
Payer: COMMERCIAL

## 2025-01-07 NOTE — PROGRESS NOTES
IVF   Transfer 24(day5)  PGt tested   YANE 25   twins  H/o adenomyosis and simpler hyerplasia  Alex partner  A+ with neg antibodies  13.6/42.5/250  GC/CT neg, trich neg  A1C 5.5  RPR, hep and HIV neg  TSH 0.5

## 2025-01-09 ENCOUNTER — HOSPITAL ENCOUNTER (OUTPATIENT)
Dept: PERINATAL CARE | Facility: OTHER | Age: 40
Discharge: HOME | End: 2025-01-09
Attending: FAMILY MEDICINE
Payer: COMMERCIAL

## 2025-01-09 ENCOUNTER — OFFICE VISIT (OUTPATIENT)
Dept: OBSTETRICS AND GYNECOLOGY | Facility: CLINIC | Age: 40
End: 2025-01-09
Payer: COMMERCIAL

## 2025-01-09 ENCOUNTER — PATIENT MESSAGE (OUTPATIENT)
Dept: OBSTETRICS AND GYNECOLOGY | Facility: CLINIC | Age: 40
End: 2025-01-09

## 2025-01-09 VITALS
DIASTOLIC BLOOD PRESSURE: 70 MMHG | WEIGHT: 108.69 LBS | BODY MASS INDEX: 20 KG/M2 | HEIGHT: 62 IN | SYSTOLIC BLOOD PRESSURE: 110 MMHG

## 2025-01-09 DIAGNOSIS — N91.2 AMENORRHEA: ICD-10-CM

## 2025-01-09 DIAGNOSIS — O21.9 NAUSEA AND VOMITING IN PREGNANCY: ICD-10-CM

## 2025-01-09 DIAGNOSIS — Z32.01 POSITIVE PREGNANCY TEST: Primary | ICD-10-CM

## 2025-01-09 DIAGNOSIS — N91.4 SECONDARY OLIGOMENORRHEA: ICD-10-CM

## 2025-01-09 DIAGNOSIS — Z36.0 ENCOUNTER FOR ANTENATAL SCREENING FOR CHROMOSOMAL ANOMALIES: ICD-10-CM

## 2025-01-09 LAB
CREAT UR-MCNC: 63 MG/DL (ref 15–325)
PROT UR-MCNC: <7 MG/DL (ref 0–15)
PROT/CREAT UR: NORMAL MG/G{CREAT} (ref 0–0.2)

## 2025-01-09 PROCEDURE — 1159F MED LIST DOCD IN RCRD: CPT | Mod: CPTII,S$GLB,, | Performed by: FAMILY MEDICINE

## 2025-01-09 PROCEDURE — 76801 OB US < 14 WKS SINGLE FETUS: CPT

## 2025-01-09 PROCEDURE — 87088 URINE BACTERIA CULTURE: CPT | Performed by: FAMILY MEDICINE

## 2025-01-09 PROCEDURE — 76801 OB US < 14 WKS SINGLE FETUS: CPT | Mod: 26,,, | Performed by: OBSTETRICS & GYNECOLOGY

## 2025-01-09 PROCEDURE — 3078F DIAST BP <80 MM HG: CPT | Mod: CPTII,S$GLB,, | Performed by: FAMILY MEDICINE

## 2025-01-09 PROCEDURE — 1160F RVW MEDS BY RX/DR IN RCRD: CPT | Mod: CPTII,S$GLB,, | Performed by: FAMILY MEDICINE

## 2025-01-09 PROCEDURE — 84156 ASSAY OF PROTEIN URINE: CPT | Performed by: FAMILY MEDICINE

## 2025-01-09 PROCEDURE — 0500F INITIAL PRENATAL CARE VISIT: CPT | Mod: CPTII,S$GLB,, | Performed by: FAMILY MEDICINE

## 2025-01-09 PROCEDURE — 99999 PR PBB SHADOW E&M-EST. PATIENT-LVL III: CPT | Mod: PBBFAC,,, | Performed by: FAMILY MEDICINE

## 2025-01-09 PROCEDURE — 87491 CHLMYD TRACH DNA AMP PROBE: CPT | Performed by: FAMILY MEDICINE

## 2025-01-09 PROCEDURE — 3074F SYST BP LT 130 MM HG: CPT | Mod: CPTII,S$GLB,, | Performed by: FAMILY MEDICINE

## 2025-01-09 PROCEDURE — 87086 URINE CULTURE/COLONY COUNT: CPT | Performed by: FAMILY MEDICINE

## 2025-01-09 RX ORDER — ONDANSETRON 4 MG/1
4 TABLET, ORALLY DISINTEGRATING ORAL DAILY PRN
Qty: 30 TABLET | Refills: 0 | Status: SHIPPED | OUTPATIENT
Start: 2025-01-09

## 2025-01-09 RX ORDER — ENOXAPARIN SODIUM 40 MG/.4ML
INJECTION SUBCUTANEOUS
COMMUNITY

## 2025-01-09 NOTE — LETTER
January 9, 2025    Brielle Sharpe  [address] [place] [id number]              [facility]  [address] [place] [id number]  Phone: [phone]  Fax: [phone]    To Whom It May Concern:    Ms. Sharpe is currently under our care for pregnancy.  Estimated Date of Delivery: 7/31/25    Please excuse her from any travel plans, flight, etc from March 3-8th.        Sincerely,    Ralph Tidwell NP

## 2025-01-09 NOTE — PROGRESS NOTES
HISTORY OF PRESENT ILLNESS:    Brilele Sharpe is a 39 y.o. female, ,  No LMP recorded (lmp unknown). Patient is pregnant.  for a routine exam complaining of amenorrhea and positive home UPT. IVF [facility] (did PGT, girl). Same partner who is healthy. Previously had twins, c section. Both healthy. With [place] ([place]) during her last pregnancy she had an elevated anticardiolipin blood test and took lovenox through 1T  and stopped. No DVT or PE hx. She is taking currently but was told she could stop with her estrogen and progesterone. Mild nausea (would like to have zofran to take sparingly, aware of r/se), no vb or pelvic pain currently (spotting early in pregnancy from [facility]) A POS. No abn pap or std hx. Stay at home mom, previously CRNA. This is the extent of the patient's complaints at this time.     Past Medical History:   Diagnosis Date    Dyspareunia     Always    Endometriosis of uterus     Just did a receptiva test with [facility]    Infertility, female     Multiple rounds of IVF       Past Surgical History:   Procedure Laterality Date    ABDOMINAL SURGERY      Lap Estefania    ANKLE SURGERY      Left     SECTION  2018    CHOLECYSTECTOMY      HYSTEROSCOPY  3/8/24    HYSTEROSCOPY W/ POLYPECTOMY  2017    SINUS SURGERY         MEDICATIONS AND ALLERGIES:      Current Outpatient Medications:     aspirin 81 MG Chew, Take 81 mg by mouth once daily., Disp: , Rfl:     LOVENOX 40 mg/0.4 mL Syrg, , Disp: , Rfl:     prenatal vit/iron fum/folic ac (PRENATAL 1+1 ORAL), Take by mouth., Disp: , Rfl:     clobetasol 0.05% (TEMOVATE) 0.05 % Oint, Apply topically once daily. (Patient not taking: Reported on 2025), Disp: 60 g, Rfl: 3    Lactobacillus rhamnosus GG (CULTURELLE) 10 billion cell capsule, Take 1 capsule by mouth once daily. (Patient not taking: Reported on 2025), Disp: , Rfl:     ondansetron (ZOFRAN-ODT) 4 MG TbDL, Take 1 tablet (4 mg total) by mouth every 8 (eight) hours as  needed (nausea). (Patient not taking: Reported on 2025), Disp: 30 tablet, Rfl: 0    vitamin D (VITAMIN D3) 1000 units Tab, Take 1,000 Units by mouth once daily. (Patient not taking: Reported on 2025), Disp: , Rfl:     Review of patient's allergies indicates:  No Known Allergies    Family History   Problem Relation Name Age of Onset    Hypertension Father Elijah Galindo     Hypertension Mother Serina Galindo     Hyperlipidemia Mother Serina Galindo     Colon cancer Maternal Uncle      Breast cancer Neg Hx      Cancer Neg Hx      Diabetes Neg Hx      Eclampsia Neg Hx      Miscarriages / Stillbirths Neg Hx      Ovarian cancer Neg Hx       labor Neg Hx      Stroke Neg Hx         Social History     Socioeconomic History    Marital status:    Tobacco Use    Smoking status: Never    Smokeless tobacco: Never   Substance and Sexual Activity    Alcohol use: No    Drug use: No    Sexual activity: Yes     Partners: Male     Birth control/protection: None     Social Drivers of Health     Financial Resource Strain: Low Risk  (2024)    Overall Financial Resource Strain (CARDIA)     Difficulty of Paying Living Expenses: Not hard at all   Food Insecurity: No Food Insecurity (2024)    Hunger Vital Sign     Worried About Running Out of Food in the Last Year: Never true     Ran Out of Food in the Last Year: Never true   Transportation Needs: No Transportation Needs (2023)    [facility]    [facility]     Lack of Transportation (Medical): No     Lack of Transportation (Non-Medical): No   Physical Activity: Inactive (2024)    Exercise Vital Sign     Days of Exercise per Week: 0 days     Minutes of Exercise per Session: 0 min   Stress: No Stress Concern Present (2024)    [employer] [place] [employer] - Occupational Stress Questionnaire     Feeling of Stress : Only a little   Housing Stability: Unknown (2024)    Housing Stability Vital Sign     " Unable to Pay for Housing in the Last Year: No       COMPREHENSIVE GYN HISTORY:  PAP History: Denies abnormal Paps.  Infection History: Denies STDs. Denies PID.  Benign History: Denies uterine fibroids. Denies ovarian cysts. Denies endometriosis. Denies other conditions.  Cancer History: Denies cervical cancer. Denies uterine cancer or hyperplasia. Denies ovarian cancer. Denies vulvar cancer or pre-cancer. Denies vaginal cancer or pre-cancer. Denies breast cancer. Denies colon cancer.  Sexual Activity History: Reports currently being sexually active  Menstrual History: None.  Contraception: None    ROS:  GENERAL: No weight changes. No swelling. No fatigue. No fever.  CARDIOVASCULAR: No chest pain. No shortness of breath. No leg cramps.   NEUROLOGICAL: No headaches. No vision changes.  BREASTS: No pain. No lumps. No discharge.  ABDOMEN: No pain. + nausea. No vomiting. No diarrhea. No constipation.  REPRODUCTIVE: No abnormal bleeding. No pelvic pain  VULVA: No pain. No lesions. No itching.  VAGINA: No relaxation. No itching. No odor. No discharge. No lesions.  URINARY: No incontinence. No nocturia. No frequency. No dysuria.    /70 (Patient Position: Sitting)   Ht 5' 2" (1.575 m)   Wt 49.3 kg (108 lb 11 oz)   LMP  (LMP Unknown)   BMI 19.88 kg/m²     PE:  Physical Exam:   Constitutional: She appears well-developed and well-nourished. She does not appear ill. No distress.        Pulmonary/Chest: Right breast exhibits no inverted nipple, no mass, no nipple discharge, no skin change, no tenderness and no swelling. Left breast exhibits no inverted nipple, no mass, no nipple discharge, no skin change, no tenderness and no swelling.          Genitourinary:    Urethra, vagina, uterus, right adnexa and left adnexa normal.      Pelvic exam was performed with patient in the lithotomy position.   The external female genitalia was normal.   Genitalia hair distrobution normal .   There is no rash or lesion on the right " labia. There is no rash or lesion on the left labia. Cervix is normal. No rectocele, cystocele or prolapse of vaginal walls in the vagina. Uterus is not tender. Normal urethral meatus.              Neurological: GCS eye subscore is 4. GCS verbal subscore is 5. GCS motor subscore is 6.         PROCEDURES:  UPT Positive  Genprobe  Pap- UTD      DIAGNOSIS:  Gyn exam  IUP with stated LMP of No LMP recorded (lmp unknown). Patient is pregnant.  Indication   ========   Indication: Encounter for Fetal Viability   Indication: IVF Pregnancy   Indication: Advanced Maternal Age (AMA), Multigravida     History   ======   Previous Outcomes    2   Para 2   Risk Factors   History risk factors: IVF pregnancy   History risk factors: Advanced maternal age     Method   ======   [place] S8, Transabdominal ultrasound examination. View: Good view     Pregnancy   =========   [place] pregnancy. Number of fetuses: 1     Dating   ======   Conception: IVF   Embryo transfer on: 2024   IVF / ET 5 d   GA by IVF / ET 11 w + 0 d   YANE by IVF / ET: 2025   Ultrasound examination on: 2025   GA by U/S based upon: CRL   GA by U/S 11 w + 6 d   YANE by U/S: 2025   Assigned: based on the IVF / ET date, selected on 2025   Assigned GA 11 w + 0 d   Assigned YANE: 2025     General Evaluation   ==============   Cardiac activity present   Placenta: anterior   Amniotic fluid: normal amount     Fetal Biometry   ============   Standard    bpm   CRL 51.4 mm 11w 6d Hadlock     Fetal Anatomy   ===========   Cranium: appears normal, midline falx visualized.   The following structures appear normal:   Abdominal wall.   The following structures were visualized:   Stomach. Arms. Legs.     Maternal Structures   ===============   Uterus / Cervix   Uterus: Normal   Cervix: Visualized   Approach: Transvaginal   Ovaries / Tubes / Adnexa   Rt ovary: Normal   Rt ovary D1 32 mm   Rt ovary D2 13 mm   Rt ovary D3 16 mm   Rt ovary Vol 3.5  cmï¿½   Lt ovary: Normal   Lt ovary D1 28 mm   Lt ovary D2 26 mm   Lt ovary D3 11 mm   Lt ovary Vol 4.3 cmï¿½   Cul de Sac / Bladder / Kidneys / Other   Free fluid: No free fluid visualized     Impression   =========   A lisa IUP with cardiac activity is identified. YANE is assigned based on the CRL from today?s study. If other clinical data, such as IVF conception dating or prior   ultrasound assignment of YANE differs from the YANE assigned today, please contact the [facility] department so that this report can be revised to reflect the correct YANE.   The maternal adnexae are normal in appearance.   The amount of free fluid seen in the pelvis is within normal physiologi   PLAN:Routine prenatal care    MEDICATIONS PRESCRIBED:  PNV    LABS AND TESTS ORDERED:  New Ob Labs      1st TRIMESTER COUNSELING: Discussed all, booklet provided  Common complaints of pregnancy  HIV and other routine prenatal tests including  genetic screening  Risk factors identified by prenatal history  Anticipated course of prenatal care  Nutrition and weight gain counseling  Toxoplasmosis precautions (Cats/Raw Meat)  Sexual activity and exercise  Environmental/Work hazards  Travel  Tobacco (Ask, Advise, Assess, Assist, and Arrange), as well as alcohol and drug use  Use of any medications (Including supplements, Vitamins, Herbs, or OTC Drugs)  Indications for Ultrasound  Domestic violence  Seat belt use  Childbirth classes/Hospital facilities     TERATOLOGY COUNSELING: Discussed options for MT and carrier screening done previously with jessica GUTIERREZ.       FOLLOW-UP for a New Ob Visit in  4    weeks with   -discussed to call clinic or L&D/ER if after hours for pain/bleeding  -discussed otc meds/tx for NV she will let me know if further rx needed  -baseline preE labs for AMA, on baby asa already

## 2025-01-09 NOTE — PATIENT INSTRUCTIONS
LABOR AND DELIVERY PHONE NUMBER, [phone] (OPEN , LOCATED ON 6TH FLOOR OF HOSPITAL)  [address] PHONE NUMBER, [phone] (OPEN MON-FRI, 8a-5p)     1) Eat small frequent meals through the day versus three large meals  2) Try ginger ale or sprite to help settle the stomach - you can also take ginger capsules (250mg 4 times per day)  3) Eat crackers or dry toast before getting out of bed in the morning   4) Stay hydrated by drinking plenty of water-do not immediately eat or drink something after vomiting. Give your stomach a rest for 20-30 minutes. Slowly reintroduce ice chips, small sips water, crackers, etc.    5) Try OTC unisom (1/2 tablet) and vitamin B6 - take the 25mg b6 twice a day and then both together at night before bed. This can help with the nausea in the morning and is safe to use during pregnancy.  6) Sea bands (Accupressure wrist bands)    If you are unable to keep anything down and constantly vomiting for more that 24 hours, let the office know so that dehydration can be avoided. We would recommend being seen in the emergency department if this is the case.       Topic  General Pregnancy Information Recommended   (Unless Otherwise Contraindicated Or Restrictions Given To You By Your OB Doctor)      1. Anticipated course of prenatal care      Visits: will be Every 4 wks until 28 weeks, then every 2 weeks until 36 weeks, and then weekly until delivery.   Urine will be collected at each Obstetric visit        2. Nutrition and weight gain    Daily pre-po vitamin (recommend taking at night)   Additional 300 calories needed daily  No Sushi, hotdogs, unpasteurized products (milk/cheeses). No large fish such as: shark, mario mackerel, tile, sword fish   Incorporate 12 ounces of smaller seafood/week and no more than 6oz of albacore tuna   Caffeine: 200 mg/day or 2 cups of caffeine/day   Weight gain recommendations are based off of BMI before pregnancy. Generally patients who with normal weight prior  pregnancy it is recommended 25-35 pounds of weight gain during the pregnancy with an estimated weekly gain of 1 pound/wk in 2nd and 3rd Trimester.    3. Toxoplasmosis precautions  If cats are in the home avoid changing litter boxes and if you need to change the litter box recommended you use gloves   4. Sexual Activity  Sexual activity is okay unless you are put on restrictions by your provider. I recommend urinating after intercourse    5. Exercise  Generally pre-pregnancy routine is okay to continue   Drink plenty fluids for hydration   Stop any activity that causes heavy cramping like a period or bright red bleeding and contact your provider  No extreme or contact sports   No exercise on your back for an extended period of time after 20 weeks    6. Hot Tub/Saunas  Avoid hot tubes and saunas    7. Hair Treatments  Because of the lack of scientific studies on the effects of chemical treatments on your hair, we must advise that you do it at your own risk. If you choose to treat your hair, we recommend that you wait until after 12 weeks gestation. At this time there is no reason to believe that normal hair treatment is associated with onsequences to the baby.    8. Vaccines  Influenza vaccine is recommended by CDC during flu season   Tdap (pertussis or whopping cough) recommended each pregnancy between 27 and 36 weeks   Tdap booster recommended for family and other planned direct caregivers    9. Water  Water is an important nutrient in a good diet. However, it cannot be stressed enough that during pregnancy water is essential. The body has increased circulation through blood vessels, and without a large increase in water, pregnant women will be dehydrated. It plays an important role in decreasing constipation, preventing  contractions, decreasing swelling, and preventing dizziness. We recommend that you drink 8-10 glasses of water per day.    10. Smoking/Alcohol/Illicit Drug Use  No safe Level   Can lead to  problems with pregnancy   Growth of the developing fetus    labor (delivery before 37 weeks)    rupture of the membranes (water breaking before 37 weeks)   Premature separation of the placenta (which may cause bleeding)   American College of Obstetricians and Gynecologists endorses abstinence   Can lead to babies with disabilities    11. Environmental or work hazards  Unless otherwise restricted you may continue work throughout the pregnancy   Notify your provider of any work hazards or chemical exposure concerns   12. Travel    Safe to travel up to 35 weeks   Continue to wear a seatbelt and airbags are still recommended   Drink plenty fluids   Blood clots are a concern during pregnancy with long travel. Recommend compression stockings and moving around at least every 2 hours and staying hydrated.    13. Use of medications, vitamins, herbs, OTC drugs    Any medications not on the list provided to you from our clinic or given to you by one of our providers we recommend calling to make sure the medication is safe for you and baby.    14. Domestic Violence    Please notify office immediately of any concerns or violence so that we can help direct you to assistance needed   [place] [employer]: [phone]    15. Childbirth classes    List of Childbirth classes from [facility] is available    16. Selecting a Pediatrician  Selecting a pediatrician before delivery is recommended  You can interview pediatricians before delivery    17. Fetal Monitoring    A simple test of your babys well-being is a kick count. After 26 weeks, fetal motion of any kind should be monitored. Further discussion at that time   18.  Labor Signs    Water break, leaking fluids from Vagina prior 37 weeks  Regular contractions, Contractions that are more than 5-6/hour, getting stronger and painful with lower back pain, does not go away with rest and fluids    19. Postpartum Family Planning    Multiple  options available from short term methods to long term reversible and irreversible methods   Discuss with provider as you get closer to delivery    20. Dental    It is recommended that you get an annual dental cleaning    21. Breastfeeding    Classes offered at [facility] and it is recommended to take a class    22. Lifting In 2013, the National [place] for Occupational Safety and Health (NIOSH) published clinical guidelines for occupational lifting in uncomplicated pregnancies. The recommended weight limits are based on gestational age, intermittent versus repetitive lifting, time (hours/day) spent lifting, and lifting height from floor and distance in 3 front of body. In this guideline, the maximum permissible weight for a woman less than 20 weeks of gestation performing infrequent lifting is 36 pounds (16 kgs) and the maximum permissible weight at >=20 weeks is 26 pounds (12 kgs). For repetitive lifting >=1 hour/day, the maximum weights in the first and second half of pregnancy are 18 pounds (8 kgs) and 13 pounds (6 kgs), respectively, and for repetitive lifting <1 hour/day, the maximum weights are 30 pounds (14 kgs) and 22 pounds (10 kgs), respectively. Although not based on high quality evidence, these guidelines are a reasonable reference for counseling pregnant women     23. Scheduling and Provider Availability    Your Obstetric Doctor is usually here weekly but not every day. We recommend you make 3-4 advanced appointments at a time to accommodate your personal needs and work/school obligations.   We ask that you come 15 minutes prior your scheduled appointment.   For same day appointments (not routine appointments) there is a Nurse Practitioner or another obstetric provider available. Please let the  aware you are an OB patient requesting a same day appointment.      24. Recommended Phone [facility]      MEDICATIONS IN PREGNANCY     While some medications are considered safe to take  during pregnancy, the effects of other medications on your unborn baby are unknown. Therefore, it is very important to pay special attention to medications you take while you are pregnant.   If you were taking prescription medications before you became pregnant, please ask your health care provider about continuing these medications as soon as you find out that you are pregnant. Do not stop taking any medications without discussing with your health care provider. Your health care provider will weigh the benefits and risks to your pregnancy when making his or her recommendation. With some medications, the risk of not taking them may be more serious than the potential risk of taking them.   If you are prescribed any new medication, please inform your health care provider that you are pregnant. Be sure to discuss the risks and benefits of the newly prescribed medication with your health care provider before taking the medication. We are always available to answer any questions about new medications and how they relate to your pregnancy.     Are Alternative Pregnancy Medicine Therapies Safe?   Many pregnant women believe natural products can be safely used to relieve nausea, backache, and other annoying symptoms of pregnancy, but many of these so-called natural products have not been tested for their safety and effectiveness. Therefore, it is very important to check with your health care provider before taking any alternative therapies. She will not recommend a product or therapy until it is shown to be safe and effective.     Which Over the Counter Drugs Are Safe?   Prenatal vitamins, now available without a prescription, are safe and important to take during pregnancy. Ask your health care provider about the safety of taking other vitamins, herbal remedies and supplements during pregnancy. Most herbal preparations and supplements have not been proven to be safe during pregnancy. Generally, you should not take any  over-the-counter medication unless it is necessary. The following medications and home remedies have no known harmful effects during pregnancy when taken according to the package directions. If you want to know about the safety of any other medications not listed here, please contact your health care provider.      Problem:  Safe to Take:    Pain relief, headache, and fever  Acetaminophen - Tylenol, Anacin Aspirin-Free    Heartburn  Acid neutralizers - Maalox, Mylanta, Rolaids, Tums, Gaviscon   Histamine-blockers - Pepcid, Zantac, Prilosec    Gas pains and bloating  Simethicone - Gas-X, Maalox Anti-Gas, Mylanta Gas, Mylicon    Nausea  Hillary - beverages, tablets, candies   Vitamin B6   Emetrol (if not diabetic)   Sea bands   Anti-histamines - Sleep-lamonte, Benadryl, Bonnine, Dramamine    Cough  Guaifenesin (expectorant) - Hytuss, Mucinex, Robitussin   Dextromethorphan (antitussive) - Benylin, Delsym, Scot-Tussin DM   Guaifenesin plus dextromethorphan - Benylin Expectorant, Robitussin DM    Congestion  Pseudoephedrine - Sudafed, Actifed, Dristan, Neosynephrine   Vicks VapoRub   Saline nasal drops or spray    Sore throat  Throat lozenges - Sucrets, Cepacol, Cepastat, Ricola   Chloroseptic Spray   Warm salt/water gargle    Allergy relief  Chlorpheniramine - Chlor-Trimeton, Triaminic   Loratadine - Alavert, Claritin, Tavist ND, Triaminic Allerchews   Cetirizine - Zyrtec   Diphenhydramine -Benadryl, Diphenhist    Rashes  Hydrocortisone cream or ointment   Caladryl lotion or cream   Benadryl cream   Oatmeal bath (Aveeno)    Diarrhea  Loperamide - Imodium, Kaopectate, Maalox Anti-Diarrheal, Pepto Bismol    Constipation  Fiber supplements - Metamucil, Citrucel, Fiberall/Fibercon, Benefiber   Stool softeners - Colace, Senekot, Dulcolax   Milk of Magnesia    Hemorrhoids  Warm baths   Witch hazel preparations - Tucks medicated pads   Steroid preparations - Anusol-HC, Preparation H    Insomnia  Diphenhydramine - Benadryl, Unisom  SleepGels, Nytol, Sominex   Doxylamine succinate - Unisom Nighttime Sleep-Aid    First-aid ointments  Cortaid, Lanacort, Polysporin, Bacitracin, Neosporin    Yeast infections  Call office for appointment      Connected MOM   Using Wireless Technology to Manage Your Prenatal Health   Congratulations! Your team here at [facility] is excited for the upcoming addition to your family and is ready to support you over the course of your pregnancy. One of the ways we are prepared to help you is through our exciting new Digital Medicine Program, Connected MOM. Connected MOM stands for Connected Maternity Online Monitoring and is offered free of charge as a way to help our expectant mothers manage their pregnancy with fewer visits to the obstetrician.    In the fast-paced environment we live in, patients demand convenient, reliable access to healthcare at their fingertips. Recommended by The American College of Obstetricians and Gynecologists (ACOG), the standard prenatal care model for low-risk pregnancies can average anywhere between 12-14 in-office prenatal visits.    Through this innovative program, participating mothers-to-be receive a wireless scale, wireless blood pressure cuff and an at-home urine protein test kit. Through the use of a smartphone, patients can easily send their weight, blood pressure readings and urine protein test results digitally in real-time from the comfort of their own homes. Results are sent directly to their [facility] account, the systems online patient portal which connects directly to the patients Electronic Medical Record. A specialized Connected MOM care team - comprised of the patients obstetrician, a dedicated health  and a  - reviews the data and provides medical recommendations as needed. This allows expectant mothers to receive care proactively, wherever they are, while minimizing the need for in-person visits and thus minimizing  disruption to their regular daily activities.    How it Works At the initial prenatal visit, interested patients can work with their obstetrician to sign up for the program. After the appointment, expectant mothers can head to the [employer], a first-of-its-kind retail experience created by [facility] offering the latest in cutting-edge, interactive healthcare technology, to receive a Connected MOM kit containing all of the digital tools needed for remote monitoring. Once enrolled, patients weigh themselves regularly and take their blood pressure once a week. Instead of coming to three office appointments at weeks 20, 30, 37 the patient will have the appointment at home. They will take their blood pressure, weight and perform three at-home urine protein tests at these home visits. Results are directly transmitted into the EMR, and notifications and messages from the care team are communicated via [facility].     TECH SUPPORT [phone]  [email or web address]      Chromosomal testing  The sequential screen is a test done around 11-13 weeks that consists of an ultrasound that measures the nuchal fold (neck thickness) of baby and blood work on the same day. You get a second set of labs done a few weeks later after 15 weeks. Based on all three of these results, they are able to tell you if you are considered to be low risk or high risk regarding neural tube defects and chromosomal abnormalities like Down Syndrome. If you are at all interested, I usually place the order today so we do not miss the window. Someone will give you a phone call in a week or two to schedule this. If you do not want it, just tell them no thank you. This test is typically covered by your insurance and is performed by the Maternal Fetal Medicine (MFM) department here at [facility]. It will not tell you the sex of the baby.     OR     2.  Call [phone] to see about coverage and any out of pocket costs regarding the [id number] (MT21) testing.  This is done any day after 10 weeks and is blood work only. It checks for any chromosomal abnormalities like Down Syndrome. You can also find out the sex of the baby if you choose to know. Once you find out coverage and decide to proceed, send either myself or your doctor a message and we can see what date you can do it. It is done at our second floor lab at [facility].

## 2025-01-10 ENCOUNTER — PATIENT MESSAGE (OUTPATIENT)
Dept: ADMINISTRATIVE | Facility: OTHER | Age: 40
End: 2025-01-10
Payer: COMMERCIAL

## 2025-01-10 DIAGNOSIS — R76.0 ANTICARDIOLIPIN ANTIBODY POSITIVE: Primary | ICD-10-CM

## 2025-01-10 DIAGNOSIS — Z78.9 CONCEIVED BY IN VITRO FERTILIZATION: ICD-10-CM

## 2025-01-10 DIAGNOSIS — Z36.9 ENCOUNTER FOR FETAL ULTRASOUND: Primary | ICD-10-CM

## 2025-01-10 DIAGNOSIS — O09.521 MULTIGRAVIDA OF ADVANCED MATERNAL AGE IN FIRST TRIMESTER: ICD-10-CM

## 2025-01-10 LAB
BACTERIA UR CULT: ABNORMAL

## 2025-01-11 LAB
C TRACH DNA SPEC QL NAA+PROBE: NOT DETECTED
N GONORRHOEA DNA SPEC QL NAA+PROBE: NOT DETECTED

## 2025-01-13 DIAGNOSIS — N39.0 GROUP B STREPTOCOCCAL UTI: Primary | ICD-10-CM

## 2025-01-13 DIAGNOSIS — B95.1 GROUP B STREPTOCOCCAL UTI: Primary | ICD-10-CM

## 2025-01-13 RX ORDER — AMOXICILLIN 875 MG/1
875 TABLET, FILM COATED ORAL EVERY 12 HOURS
Qty: 10 TABLET | Refills: 0 | Status: SHIPPED | OUTPATIENT
Start: 2025-01-13 | End: 2025-01-18

## 2025-01-14 ENCOUNTER — PATIENT MESSAGE (OUTPATIENT)
Dept: OBSTETRICS AND GYNECOLOGY | Facility: CLINIC | Age: 40
End: 2025-01-14
Payer: COMMERCIAL

## 2025-01-15 ENCOUNTER — INITIAL PRENATAL (OUTPATIENT)
Dept: OBSTETRICS AND GYNECOLOGY | Facility: CLINIC | Age: 40
End: 2025-01-15
Payer: COMMERCIAL

## 2025-01-15 VITALS — BODY MASS INDEX: 20.4 KG/M2 | DIASTOLIC BLOOD PRESSURE: 68 MMHG | WEIGHT: 111.56 LBS | SYSTOLIC BLOOD PRESSURE: 90 MMHG

## 2025-01-15 DIAGNOSIS — Z3A.12 12 WEEKS GESTATION OF PREGNANCY: Primary | ICD-10-CM

## 2025-01-15 DIAGNOSIS — O09.521 MULTIGRAVIDA OF ADVANCED MATERNAL AGE IN FIRST TRIMESTER: ICD-10-CM

## 2025-01-15 PROCEDURE — 99999 PR PBB SHADOW E&M-EST. PATIENT-LVL III: CPT | Mod: PBBFAC,,, | Performed by: FAMILY MEDICINE

## 2025-01-15 PROCEDURE — 0500F INITIAL PRENATAL CARE VISIT: CPT | Mod: CPTII,S$GLB,, | Performed by: FAMILY MEDICINE

## 2025-01-15 NOTE — PROGRESS NOTES
Here for routine OB appt at 12w5d,c/o light spotting last night and mild cramping last night/today. No ctx, LOF. No HAs. Not PCB. Was doing some housework day before she noticed it. Bleeding has since stopped. A POS. Dating US NL. Waiting to get price on MT21. Signed up for CM. MFM appt next week.  Pain, bleeding, and PTL precautions given.  : scant spotting, nothing active from os. Os visibly closed.   F/U scheduled 4 weeks

## 2025-01-15 NOTE — PATIENT INSTRUCTIONS
LABOR AND DELIVERY PHONE NUMBER, [phone] (OPEN 24/7, LOCATED ON 6TH FLOOR OF HOSPITAL)  [address] PHONE NUMBER, [phone] (OPEN MON-FRI, 8a-5p)

## 2025-01-16 ENCOUNTER — TELEPHONE (OUTPATIENT)
Dept: OBSTETRICS AND GYNECOLOGY | Facility: CLINIC | Age: 40
End: 2025-01-16
Payer: COMMERCIAL

## 2025-01-16 NOTE — TELEPHONE ENCOUNTER
----- Message from [employer]ur[employer] sent at 1/16/2025 10:07 AM CST -----  Regarding: Self [phone]  Type: Patient Call Back    Who called: Self      What is the request in detail: pt called stating she was prescribed amoxicillin (AMOXIL) 875 MG tablet but has questions for the nurse about the medication. Would like a call back.     Can the clinic reply by [facility]? No     Would the patient rather a call back or a response via [facility]? Call back     Best call back number: [phone]     Additional Information:    Thank you.

## 2025-01-23 ENCOUNTER — OFFICE VISIT (OUTPATIENT)
Dept: MATERNAL FETAL MEDICINE | Facility: CLINIC | Age: 40
End: 2025-01-23
Attending: OBSTETRICS & GYNECOLOGY
Payer: COMMERCIAL

## 2025-01-23 DIAGNOSIS — R76.0 ANTICARDIOLIPIN ANTIBODY POSITIVE: ICD-10-CM

## 2025-01-23 DIAGNOSIS — O09.521 MULTIGRAVIDA OF ADVANCED MATERNAL AGE IN FIRST TRIMESTER: ICD-10-CM

## 2025-01-23 DIAGNOSIS — Z78.9 CONCEIVED BY IN VITRO FERTILIZATION: ICD-10-CM

## 2025-01-23 PROBLEM — N91.2 AMENORRHEA: Status: RESOLVED | Noted: 2025-01-09 | Resolved: 2025-01-23

## 2025-01-23 NOTE — ASSESSMENT & PLAN NOTE
No clinical criteria for phospholipid antibody syndrome  - APS labs assessed during fertility evaluation with BRENDA  - Cardiolipin IgG sustained indeterminate  - Other labs negative  Current regimen (01/23/2025):   - Heparin BID     Pt has been on heparin through the IVF process. In her last pregnancy she was likewise on heparin through the first trimester before discontinuing per MFM recs. As she meets neither clinical nor laboratory criteria, benefits of heparin (low molecular weight or otherwise) do not outweigh the risks going forward.    Recommendations:  1. Discontinue heparin at this time

## 2025-01-23 NOTE — ASSESSMENT & PLAN NOTE
Age-related Down Syndrome risk (DSR): 1:186 (egg retrieval at age 37y)  Chromosome screening: normal PGT-A, considering cell free DNA    Today I counseled the patient on the relationship between maternal age and adverse pregnancy outcomes. The most prominent is fetal aneuploidy.   We reviewed that ultrasound is a mediocre screening tool for aneuploidy, missing about 50% of cases of trisomy 21. We discussed non-invasive screening options, including integrated screening (1T NT + first and second trimester analytes), serum integrated (first and second trimester analytes), second trimester quad or penta screen and cell free DNA screening. Amniocentesis or chorionic villus sampling for karyotype or chromosomal microarray is also an option.   Additional risks associated with maternal age greater than 35 include preeclampsia, gestational diabetes, and fetal growth restriction.  Maternal age greater than 40 is associated with increased risk of stillbirth. There is no established preventative measures against gestational diabetes once gestation is already started.  Early onset preeclampsia can be prevented in high risk patients with daily baby aspirin.  Fetal growth restriction is screened for with 3rd trimester ultrasound.     Recommendations:  1. Detailed anatomic survey at 19-20 weeks    2. Start ASA 81 mg daily at 12 weeks  3. See IVF header for growth surveillance and prenatal testing  4. Delivery timing is 39-40 weeks in the absence of indication for earlier delivery

## 2025-01-23 NOTE — Clinical Note
Hi y'all,  Ms. Sharpe just needs a targeted scan at 18-19 weeks without MD follow up.  Lázaro Lane MD

## 2025-01-23 NOTE — PROGRESS NOTES
MATERNAL-FETAL MEDICINE   CONSULT NOTE    Provider requesting consultation: Dr. Burkett    SUBJECTIVE:     Ms. Brielle Sharpe is a 39 y.o.  female with IUP at 13w0d who is seen in consultation by MFM for evaluation and management of:  Problem   Anticardiolipin Antibody Positive   Conceived By in Vitro Fertilization   Multigravida of Advanced Maternal Age in First Trimester   Amenorrhea (Resolved)     The patient location is: home  The chief complaint leading to consultation is: Cardiolipin antibody indeterminate    Visit type: audiovisual    Face to Face time with patient: 18  32 minutes of total time spent on the encounter, which includes face to face time and non-face to face time preparing to see the patient (eg, review of tests), Obtaining and/or reviewing separately obtained history, Documenting clinical information in the electronic or other health record, Independently interpreting results (not separately reported) and communicating results to the patient/family/caregiver, or Care coordination (not separately reported).         Each patient to whom he or she provides medical services by telemedicine is:  (1) informed of the relationship between the physician and patient and the respective role of any other health care provider with respect to management of the patient; and (2) notified that he or she may decline to receive medical services by telemedicine and may withdraw from such care at any time.    Notes:            Medication List with Changes/Refills   Current Medications    ASPIRIN 81 MG CHEW    Take 81 mg by mouth once daily.    LOVENOX 40 MG/0.4 ML SYRG        ONDANSETRON (ZOFRAN-ODT) 4 MG TBDL    Take 1 tablet (4 mg total) by mouth daily as needed (nausea).    PRENATAL VIT/IRON FUM/FOLIC AC (PRENATAL 1+1 ORAL)    Take by mouth.       Review of patient's allergies indicates:  No Known Allergies    PMH:  Past Medical History:   Diagnosis Date    Dyspareunia     Always    Endometriosis of uterus      Just did a receptiva test with [facility]    Infertility, female     Multiple rounds of IVF       PObHx:  OB History    Para Term  AB Living   2 1 1     2   SAB IAB Ectopic Multiple Live Births         1 2      # Outcome Date GA Lbr Sravan/2nd Weight Sex Type Anes PTL Lv   2 Current            1A Term 18 37w4d  3.005 kg (6 lb 10 oz) M CS-LTranv  N ROMAN   1B Term 18 37w4d  2.693 kg (5 lb 15 oz) F CS-LTranv  N ROMAN       PSH:  Past Surgical History:   Procedure Laterality Date    ABDOMINAL SURGERY      Lap Estefania    ANKLE SURGERY      Left     SECTION  2018    CHOLECYSTECTOMY      HYSTEROSCOPY  3/8/24    HYSTEROSCOPY W/ POLYPECTOMY  2017    SINUS SURGERY         Family history:family history includes Colon cancer in her maternal uncle; Hyperlipidemia in her mother; Hypertension in her father and mother.    Social history: reports that she has never smoked. She has never used smokeless tobacco. She reports that she does not drink alcohol and does not use drugs.    Genetic history:  The patient denies any inherited genetic diseases or birth defects in herself or her partner's personal history or family.    Objective:   LMP  (LMP Unknown)     Gen: Well appearing woman in no acute distress    Significant labs/imaging:  Cardiolipin IgM (21): 17.3 MPL (RR <15)  Other APS antibodies (21): Negative (see results in media tab)    ASSESSMENT/PLAN:     39 y.o.  female with IUP at 13w0d     Anticardiolipin antibody positive  No clinical criteria for phospholipid antibody syndrome  - APS labs assessed during fertility evaluation with [facility]  - Cardiolipin IgG sustained indeterminate  - Other labs negative  Current regimen (2025):   - Heparin BID     Pt has been on heparin through the IVF process. In her last pregnancy she was likewise on heparin through the first trimester before discontinuing per [facility] recs. As she meets neither clinical nor laboratory criteria,  benefits of heparin (low molecular weight or otherwise) do not outweigh the risks going forward.    Recommendations:  1. Discontinue heparin at this time    Multigravida of advanced maternal age in first trimester  Age-related Down Syndrome risk (DSR): 1:186 (egg retrieval at age 37y)  Chromosome screening: normal PGT-A, considering cell free DNA    Today I counseled the patient on the relationship between maternal age and adverse pregnancy outcomes. The most prominent is fetal aneuploidy.   We reviewed that ultrasound is a mediocre screening tool for aneuploidy, missing about 50% of cases of trisomy 21. We discussed non-invasive screening options, including integrated screening (1T NT + first and second trimester analytes), serum integrated (first and second trimester analytes), second trimester quad or penta screen and cell free DNA screening. Amniocentesis or chorionic villus sampling for karyotype or chromosomal microarray is also an option.   Additional risks associated with maternal age greater than 35 include preeclampsia, gestational diabetes, and fetal growth restriction.  Maternal age greater than 40 is associated with increased risk of stillbirth. There is no established preventative measures against gestational diabetes once gestation is already started.  Early onset preeclampsia can be prevented in high risk patients with daily baby aspirin.  Fetal growth restriction is screened for with 3rd trimester ultrasound.     Recommendations:  1. Detailed anatomic survey at 19-20 weeks    2. Start ASA 81 mg daily at 12 weeks  3. See IVF header for growth surveillance and prenatal testing  4. Delivery timing is 39-40 weeks in the absence of indication for earlier delivery    Conceived by in vitro fertilization  Frozen single embryo transfer    Recommendations:  1. Targeted midtrimester US with transvaginal assessment for placenta/vasa previa  2. 32 week growth assessment  3. Weekly prenatal testing to start at 36  weeks      FOLLOW UP:   F/u in 6 weeks for US  No further MFM visits were scheduled        Cristo Lane III  Maternal-Fetal Medicine    Electronically Signed by Cristo Lane III January 23, 2025

## 2025-01-23 NOTE — ASSESSMENT & PLAN NOTE
Frozen single embryo transfer    Recommendations:  1. Targeted midtrimester US with transvaginal assessment for placenta/vasa previa  2. 32 week growth assessment  3. Weekly prenatal testing to start at 36 weeks

## 2025-01-27 ENCOUNTER — PATIENT MESSAGE (OUTPATIENT)
Dept: OBSTETRICS AND GYNECOLOGY | Facility: CLINIC | Age: 40
End: 2025-01-27
Payer: COMMERCIAL

## 2025-01-27 DIAGNOSIS — O09.521 AMA (ADVANCED MATERNAL AGE) MULTIGRAVIDA 35+, FIRST TRIMESTER: Primary | ICD-10-CM

## 2025-01-28 ENCOUNTER — LAB VISIT (OUTPATIENT)
Dept: LAB | Facility: OTHER | Age: 40
End: 2025-01-28
Attending: FAMILY MEDICINE
Payer: COMMERCIAL

## 2025-01-28 DIAGNOSIS — Z36.0 ENCOUNTER FOR ANTENATAL SCREENING FOR CHROMOSOMAL ANOMALIES: ICD-10-CM

## 2025-01-28 PROCEDURE — 36415 COLL VENOUS BLD VENIPUNCTURE: CPT | Performed by: FAMILY MEDICINE

## 2025-01-30 ENCOUNTER — LAB VISIT (OUTPATIENT)
Dept: LAB | Facility: OTHER | Age: 40
End: 2025-01-30
Attending: FAMILY MEDICINE
Payer: COMMERCIAL

## 2025-01-30 ENCOUNTER — ROUTINE PRENATAL (OUTPATIENT)
Dept: OBSTETRICS AND GYNECOLOGY | Facility: CLINIC | Age: 40
End: 2025-01-30
Payer: COMMERCIAL

## 2025-01-30 ENCOUNTER — NURSE TRIAGE (OUTPATIENT)
Dept: ADMINISTRATIVE | Facility: CLINIC | Age: 40
End: 2025-01-30
Payer: COMMERCIAL

## 2025-01-30 VITALS
BODY MASS INDEX: 20.32 KG/M2 | SYSTOLIC BLOOD PRESSURE: 100 MMHG | WEIGHT: 111.13 LBS | DIASTOLIC BLOOD PRESSURE: 60 MMHG

## 2025-01-30 DIAGNOSIS — O09.521 AMA (ADVANCED MATERNAL AGE) MULTIGRAVIDA 35+, FIRST TRIMESTER: ICD-10-CM

## 2025-01-30 DIAGNOSIS — Z3A.14 14 WEEKS GESTATION OF PREGNANCY: ICD-10-CM

## 2025-01-30 DIAGNOSIS — Z3A.14 14 WEEKS GESTATION OF PREGNANCY: Primary | ICD-10-CM

## 2025-01-30 LAB
BASOPHILS # BLD AUTO: 0.03 K/UL (ref 0–0.2)
BASOPHILS NFR BLD: 0.3 % (ref 0–1.9)
DIFFERENTIAL METHOD BLD: ABNORMAL
EOSINOPHIL # BLD AUTO: 0.3 K/UL (ref 0–0.5)
EOSINOPHIL NFR BLD: 3.4 % (ref 0–8)
ERYTHROCYTE [DISTWIDTH] IN BLOOD BY AUTOMATED COUNT: 11.9 % (ref 11.5–14.5)
HCT VFR BLD AUTO: 36.2 % (ref 37–48.5)
HGB BLD-MCNC: 12.4 G/DL (ref 12–16)
IMM GRANULOCYTES # BLD AUTO: 0.03 K/UL (ref 0–0.04)
IMM GRANULOCYTES NFR BLD AUTO: 0.3 % (ref 0–0.5)
LYMPHOCYTES # BLD AUTO: 1.8 K/UL (ref 1–4.8)
LYMPHOCYTES NFR BLD: 19.6 % (ref 18–48)
MCH RBC QN AUTO: 31.2 PG (ref 27–31)
MCHC RBC AUTO-ENTMCNC: 34.3 G/DL (ref 32–36)
MCV RBC AUTO: 91 FL (ref 82–98)
MONOCYTES # BLD AUTO: 0.7 K/UL (ref 0.3–1)
MONOCYTES NFR BLD: 7.9 % (ref 4–15)
NEUTROPHILS # BLD AUTO: 6.2 K/UL (ref 1.8–7.7)
NEUTROPHILS NFR BLD: 68.5 % (ref 38–73)
NRBC BLD-RTO: 0 /100 WBC
PLATELET # BLD AUTO: 208 K/UL (ref 150–450)
PMV BLD AUTO: 10.5 FL (ref 9.2–12.9)
RBC # BLD AUTO: 3.98 M/UL (ref 4–5.4)
WBC # BLD AUTO: 9.03 K/UL (ref 3.9–12.7)

## 2025-01-30 PROCEDURE — 36415 COLL VENOUS BLD VENIPUNCTURE: CPT | Performed by: FAMILY MEDICINE

## 2025-01-30 PROCEDURE — 99999 PR PBB SHADOW E&M-EST. PATIENT-LVL III: CPT | Mod: PBBFAC,,, | Performed by: FAMILY MEDICINE

## 2025-01-30 PROCEDURE — 85025 COMPLETE CBC W/AUTO DIFF WBC: CPT | Performed by: FAMILY MEDICINE

## 2025-01-30 PROCEDURE — 0502F SUBSEQUENT PRENATAL CARE: CPT | Mod: CPTII,S$GLB,, | Performed by: FAMILY MEDICINE

## 2025-01-30 NOTE — TELEPHONE ENCOUNTER
Pt called and stated she is 14 weeks pregnant and started having intermittent pain on right side of belly button since yesterday. Pt reports hx of umbilical hernia but stated she's never has issues with it in the past. Pt requesting call back from OBGYN for an appt.   Reason for Disposition   Patient wants to be seen    Additional Information   Negative: Passed out (e.g., fainted, lost consciousness, blacked out and was not responding)   Negative: Shock suspected (e.g., cold/pale/clammy skin, too weak to stand, low BP, rapid pulse)   Negative: Difficult to awaken or acting confused (e.g., disoriented, slurred speech)   Negative: Sounds like a life-threatening emergency to the triager   Negative: MODERATE-SEVERE abdominal pain   Negative: SEVERE vaginal bleeding (e.g., soaking 2 pads or tampons per hour and present 2 or more hours; 1 menstrual cup every 2 hours)   Negative: MODERATE vaginal bleeding (e.g., soaking 1 pad or tampon per hour and present > 6 hours; 1 menstrual cup every 6 hours)   Negative: MODERATE vaginal bleeding and pregnant > 12 weeks   Negative: Passed tissue (e.g., gray-white)   Negative: Vomiting and contains red blood or black ('coffee ground') material   Negative: Shoulder pain   Negative: MILD constant abdominal pain (e.g., doesn't interfere with normal activities) and present > 2 hours   Negative: Intermittent lower abdominal pain lasting > 24 hours   Negative: Vaginal bleeding or spotting   Negative: White of the eyes have turned yellow (i.e., jaundice)   Negative: Feeling weak or lightheaded (e.g., woozy, feeling like they might faint)   Negative: Patient sounds very sick or weak to the triager   Negative: Fever 100.4 F (38.0 C) or higher   Negative: Unusual vaginal discharge (e.g., odorous, yellow, green, or foamy-white)   Negative: Pain or burning with passing urine (urination)   Negative: Blood in urine (red, pink, or tea-colored)    Protocols used: Pregnancy - Abdominal Pain Less Than  20 Weeks EGA-A-OH

## 2025-01-30 NOTE — PATIENT INSTRUCTIONS
LABOR AND DELIVERY PHONE NUMBER, [phone] (OPEN 24/7, LOCATED ON 6TH FLOOR OF HOSPITAL)  [address] PHONE NUMBER, [phone] (OPEN MON-FRI, 8a-5p)        Wounds

## 2025-01-30 NOTE — PROGRESS NOTES
Here for  appt at 14w0d.  Yesterday noticed right sided abdominal pain (has been going on some since she became pregnant but now more noticeable). Mostly with movement, walking, palpation. Getting a little worse today. No fever, NVD, constipation. Did not take anything for the pain. Hx of umbilical hernia so she was wondering if that was related. No FM yet., denies VB and cramping.  No ctx, LOF. Saw MFM and off lovenox now. Exam: mild right periumbilical pain to palpation, no rebound tenderness. No hernia appreciated. Can do CBC to check WBC. If fever or worsening sx will present to ED. Try tylenol, rest, stool softeners. +FM on vscan Pain, bleeding, and ED precautions given.  F/U scheduled 1 week

## 2025-02-03 ENCOUNTER — PATIENT MESSAGE (OUTPATIENT)
Dept: OBSTETRICS AND GYNECOLOGY | Facility: CLINIC | Age: 40
End: 2025-02-03
Payer: COMMERCIAL

## 2025-02-03 LAB
ABOUT THE TEST: NORMAL
APPROVED BY: NORMAL
FETAL FRACTION: NORMAL
FETAL SEX RESULT: NORMAL
GESTATIONAL AGE > 9: YES
GESTATIONAL AGE: NORMAL
LAB DIRECTOR COMMENTS: NORMAL
LIMITATIONS:: NORMAL
MONOSOMY X RESULT: NOT DETECTED
NEGATIVE PREDICTIVE VALUE: NORMAL
NOTE: NORMAL
PERFORMANCE CHARACTERISTICS: NORMAL
PERFORMANCE: NORMAL
POSITIVE PREDICTIVE VALUE: NORMAL
RESULT: NEGATIVE
SERVICE CMNT 04-IMP: NORMAL
TEST METHODOLOGY:: NORMAL
TRISOMY 13 (T13): NEGATIVE
TRISOMY 18: NEGATIVE
TRISOMY 21 (T21): NEGATIVE
XXX (TRIPLE X SYNDROME): NOT DETECTED
XXY (KLINEFELTER SYNDROME): NOT DETECTED
XYY (JACOBS SYNDROME): NOT DETECTED

## 2025-02-07 ENCOUNTER — ROUTINE PRENATAL (OUTPATIENT)
Dept: OBSTETRICS AND GYNECOLOGY | Facility: CLINIC | Age: 40
End: 2025-02-07
Payer: COMMERCIAL

## 2025-02-07 VITALS
SYSTOLIC BLOOD PRESSURE: 114 MMHG | WEIGHT: 115.75 LBS | DIASTOLIC BLOOD PRESSURE: 70 MMHG | BODY MASS INDEX: 21.17 KG/M2

## 2025-02-07 DIAGNOSIS — Z78.9 CONCEIVED BY IN VITRO FERTILIZATION: Primary | ICD-10-CM

## 2025-02-07 PROCEDURE — 0502F SUBSEQUENT PRENATAL CARE: CPT | Mod: CPTII,S$GLB,, | Performed by: STUDENT IN AN ORGANIZED HEALTH CARE EDUCATION/TRAINING PROGRAM

## 2025-02-07 PROCEDURE — 99999 PR PBB SHADOW E&M-EST. PATIENT-LVL III: CPT | Mod: PBBFAC,,, | Performed by: STUDENT IN AN ORGANIZED HEALTH CARE EDUCATION/TRAINING PROGRAM

## 2025-02-07 NOTE — PATIENT INSTRUCTIONS
[facility], Labor and Delivery is on the 6th floor of [facility]: [phone]    [email or web address]    Medications  Avoid NSAIDs (aleve, motrin, ibuprofen)  Use Tylenol or Acetaminophen for aches/pains, headaches  Begin a baby aspirin once daily (81mg) after 12 weeks  Pecid up to twice a day  GasX or simethicone for gas pains  Colace for constipation  Medications that are pregnancy category A, B or C are accepted as safe during pregnancy    Caffiene  Less than 200mg per day    Exercise  Listen to your body  Don't stay with heart rate >140bpm    Weight Gain  -- Recommended weight gain of:          Underweight        Less than 18.5            28-40            Normal Weight     18.5-24.9                    25-35            Overweight          25-29.9                       15-25            Obese                  30 and greater             11-20      Covid  https://www.[facility].org/womens-health/faqs/coronavirus-covid-19-[email or web address]-breastfeeding    https://www.[facility].org/womens-health/experts-and-stories/the-latest/yce-ap-ot-know-the-covid-19-vaccines-are-safe-and-effective-one-expert-explains    https://www.[email or web address]/coronavirus/covid-19-visitor-policy

## 2025-02-07 NOTE — PROGRESS NOTES
Pt is here for routine OB. Feeling well today  Feeling fatigued. Also has breast tenderness, has enlarged LN in both underarms. No bleeding or pain.  Works as [employer]. OMFS  Relationship with partner living together. Safe at home. No cats .   Taking PNV , ASA, D and probiotic  PMH:   PSH: lab ignacio 08. CS in 2018. Umbilical hernia.  Prior pregnancies CS twins Boy and Girl. Age 6.   Desires unsure for contraception  Wants to breastfeed  No family history of genetic disorders  No personal or family history of DM  Mother and father have HTN  No tobacco, EtOH or illicit drug use  Pap utd  No H/o HSV        -Reviewed routine PNC  -Reviewed newmom, connected mom  -Reviewed initial labs, ultrasound  -Reviewed common pregnancy complaints and comfort measures for them  -Genetic testing completed  -ASA recommended to continue through pregnancy  -BERT info on AVS  - anatomy scan scheduled  - Reviewed glucola instructions  -RTC 4 weeks    Leonor Burkett M.D.

## 2025-02-10 ENCOUNTER — TELEPHONE (OUTPATIENT)
Dept: MATERNAL FETAL MEDICINE | Facility: CLINIC | Age: 40
End: 2025-02-10
Payer: COMMERCIAL

## 2025-02-10 ENCOUNTER — PATIENT MESSAGE (OUTPATIENT)
Dept: OBSTETRICS AND GYNECOLOGY | Facility: CLINIC | Age: 40
End: 2025-02-10
Payer: COMMERCIAL

## 2025-02-10 NOTE — TELEPHONE ENCOUNTER
Spoke with patient and let her know her visit with us in March is for a ultrasound only.  If Dr. Burkett wants her to have a MFM visit that she will need to reach out to us.  Patient verbalized her understanding.    ----- Message from [place] sent at 2/10/2025 12:14 PM CST -----      Name of Who is Calling: LIBRA MEDEIROS [[id number]]      What is the request in detail: Pt called to schedule appt with MFM outside of US appt.Please contact to further discuss and advise.          Can the clinic reply by [facility]: Y      [facility]  [phone]

## 2025-02-12 ENCOUNTER — PATIENT MESSAGE (OUTPATIENT)
Dept: OBSTETRICS AND GYNECOLOGY | Facility: CLINIC | Age: 40
End: 2025-02-12
Payer: COMMERCIAL

## 2025-02-13 ENCOUNTER — PATIENT MESSAGE (OUTPATIENT)
Dept: OTHER | Facility: OTHER | Age: 40
End: 2025-02-13
Payer: COMMERCIAL

## 2025-02-20 ENCOUNTER — PATIENT MESSAGE (OUTPATIENT)
Dept: OTHER | Facility: OTHER | Age: 40
End: 2025-02-20
Payer: COMMERCIAL

## 2025-02-20 ENCOUNTER — PATIENT MESSAGE (OUTPATIENT)
Dept: OBSTETRICS AND GYNECOLOGY | Facility: CLINIC | Age: 40
End: 2025-02-20
Payer: COMMERCIAL

## 2025-02-21 ENCOUNTER — PATIENT MESSAGE (OUTPATIENT)
Dept: OBSTETRICS AND GYNECOLOGY | Facility: CLINIC | Age: 40
End: 2025-02-21
Payer: COMMERCIAL

## 2025-02-21 DIAGNOSIS — Z20.828 EXPOSURE TO THE FLU: Primary | ICD-10-CM

## 2025-02-21 RX ORDER — OSELTAMIVIR PHOSPHATE 75 MG/1
75 CAPSULE ORAL 2 TIMES DAILY
Qty: 10 CAPSULE | Refills: 0 | Status: SHIPPED | OUTPATIENT
Start: 2025-02-21 | End: 2025-02-26

## 2025-02-23 ENCOUNTER — HOSPITAL ENCOUNTER (EMERGENCY)
Facility: OTHER | Age: 40
Discharge: HOME | End: 2025-02-23
Attending: OBSTETRICS & GYNECOLOGY
Payer: COMMERCIAL

## 2025-02-23 VITALS
DIASTOLIC BLOOD PRESSURE: 70 MMHG | SYSTOLIC BLOOD PRESSURE: 111 MMHG | HEART RATE: 120 BPM | OXYGEN SATURATION: 100 % | RESPIRATION RATE: 18 BRPM | TEMPERATURE: 99 F

## 2025-02-23 DIAGNOSIS — Z03.71 ENCOUNTER FOR SUSPECTED PREMATURE RUPTURE OF AMNIOTIC MEMBRANES, WITH RUPTURE OF MEMBRANES NOT FOUND: ICD-10-CM

## 2025-02-23 DIAGNOSIS — Z3A.17 17 WEEKS GESTATION OF PREGNANCY: ICD-10-CM

## 2025-02-23 DIAGNOSIS — J10.1 INFLUENZA A: Primary | ICD-10-CM

## 2025-02-23 LAB
ALBUMIN SERPL BCP-MCNC: 3.1 G/DL (ref 3.5–5.2)
ALP SERPL-CCNC: 41 U/L (ref 40–150)
ALT SERPL W/O P-5'-P-CCNC: 21 U/L (ref 10–44)
ANION GAP SERPL CALC-SCNC: 7 MMOL/L (ref 8–16)
AST SERPL-CCNC: 24 U/L (ref 10–40)
BASOPHILS # BLD AUTO: 0.01 K/UL (ref 0–0.2)
BASOPHILS NFR BLD: 0.2 % (ref 0–1.9)
BILIRUB SERPL-MCNC: 0.3 MG/DL (ref 0.1–1)
BILIRUBIN, POC UA: NEGATIVE
BLOOD, POC UA: NEGATIVE
BUN SERPL-MCNC: 5 MG/DL (ref 6–20)
CALCIUM SERPL-MCNC: 8.6 MG/DL (ref 8.7–10.5)
CHLORIDE SERPL-SCNC: 106 MMOL/L (ref 95–110)
CLARITY, UA: CLEAR
CO2 SERPL-SCNC: 21 MMOL/L (ref 23–29)
COLOR, UA: YELLOW
CREAT SERPL-MCNC: 0.6 MG/DL (ref 0.5–1.4)
DIFFERENTIAL METHOD BLD: ABNORMAL
EOSINOPHIL # BLD AUTO: 0 K/UL (ref 0–0.5)
EOSINOPHIL NFR BLD: 0.2 % (ref 0–8)
ERYTHROCYTE [DISTWIDTH] IN BLOOD BY AUTOMATED COUNT: 12.8 % (ref 11.5–14.5)
EST. GFR  (NO RACE VARIABLE): >60 ML/MIN/1.73 M^2
FERN TEST: NEGATIVE
GLUCOSE SERPL-MCNC: 118 MG/DL (ref 70–110)
GLUCOSE, POC UA: NEGATIVE
GROUP A STREP, MOLECULAR: NEGATIVE
HCT VFR BLD AUTO: 33.5 % (ref 37–48.5)
HGB BLD-MCNC: 11.5 G/DL (ref 12–16)
IMM GRANULOCYTES # BLD AUTO: 0.03 K/UL (ref 0–0.04)
IMM GRANULOCYTES NFR BLD AUTO: 0.6 % (ref 0–0.5)
INFLUENZA A, MOLECULAR: POSITIVE
INFLUENZA B, MOLECULAR: NEGATIVE
KETONES, POC UA: 40 MG/DL
LEUKOCYTE EST, POC UA: NEGATIVE
LYMPHOCYTES # BLD AUTO: 0.3 K/UL (ref 1–4.8)
LYMPHOCYTES NFR BLD: 4.8 % (ref 18–48)
MCH RBC QN AUTO: 31.1 PG (ref 27–31)
MCHC RBC AUTO-ENTMCNC: 34.3 G/DL (ref 32–36)
MCV RBC AUTO: 91 FL (ref 82–98)
MONOCYTES # BLD AUTO: 0.4 K/UL (ref 0.3–1)
MONOCYTES NFR BLD: 7 % (ref 4–15)
NEUTROPHILS # BLD AUTO: 4.7 K/UL (ref 1.8–7.7)
NEUTROPHILS NFR BLD: 87.2 % (ref 38–73)
NITRITE, POC UA: NEGATIVE
NRBC BLD-RTO: 0 /100 WBC
PH UR STRIP: 6 [PH] (ref 5–8)
PH, BODY FLUID: NORMAL
PLATELET # BLD AUTO: 159 K/UL (ref 150–450)
PMV BLD AUTO: 10.2 FL (ref 9.2–12.9)
POTASSIUM SERPL-SCNC: 3.6 MMOL/L (ref 3.5–5.1)
PROT SERPL-MCNC: 6.8 G/DL (ref 6–8.4)
PROTEIN, POC UA: 30 MG/DL
RBC # BLD AUTO: 3.7 M/UL (ref 4–5.4)
SARS-COV-2 RDRP RESP QL NAA+PROBE: NEGATIVE
SODIUM SERPL-SCNC: 134 MMOL/L (ref 136–145)
SPECIFIC GRAVITY, POC UA: 1.02 (ref 1–1.03)
SPECIMEN SOURCE: ABNORMAL
UROBILINOGEN, POC UA: 0.2 E.U./DL
WBC # BLD AUTO: 5.4 K/UL (ref 3.9–12.7)

## 2025-02-23 PROCEDURE — 96360 HYDRATION IV INFUSION INIT: CPT

## 2025-02-23 PROCEDURE — 85025 COMPLETE CBC W/AUTO DIFF WBC: CPT

## 2025-02-23 PROCEDURE — 80053 COMPREHEN METABOLIC PANEL: CPT

## 2025-02-23 PROCEDURE — 87635 SARS-COV-2 COVID-19 AMP PRB: CPT

## 2025-02-23 PROCEDURE — 87502 INFLUENZA DNA AMP PROBE: CPT

## 2025-02-23 PROCEDURE — 99284 EMERGENCY DEPT VISIT MOD MDM: CPT | Mod: 25

## 2025-02-23 PROCEDURE — 87651 STREP A DNA AMP PROBE: CPT

## 2025-02-23 PROCEDURE — 63600175 PHARM REV CODE 636 W HCPCS

## 2025-02-23 RX ADMIN — SODIUM CHLORIDE, POTASSIUM CHLORIDE, SODIUM LACTATE AND CALCIUM CHLORIDE 1000 ML: 600; 310; 30; 20 INJECTION, SOLUTION INTRAVENOUS at 11:02

## 2025-02-23 NOTE — ED PROVIDER NOTES
Encounter Date: 2025       History     Chief Complaint   Patient presents with    Fever    Leakage/loss Of Fluid    decrease in fetal movement     Brielle Sharpe is a 39 y.o. [id number] at 17w3d presents complaining of fevers/chills, nausea/vomiting, cough, and sore throat, onset x2 days ago. Patient reports that her daughter and several of her classmates all have the flu. The patient took an at home influenza test that was positive. She reports that she began taking Tamiflu last night and has taken 2 doses. Patient reports that she last vomiting at 0000 last night. She denies any current nausea. She reports that she has been febrile, however her fever resolves with tylenol. She last took tylenol at 0730 this AM. Patient is also reporting some possible leakage of fluid that occurred yesterday. She is unsure if this is ROM vs urine vs sweat from fevering.    This IUP is complicated by Influenza, H/O C/S x1, GBS UTI.  Patient denies contractions, denies vaginal bleeding, reports LOF.      Review of patient's allergies indicates:  No Known Allergies  Past Medical History:   Diagnosis Date    Dyspareunia     Always    Endometriosis of uterus     [facility]    Infertility, female     Multiple rounds of IVF     Past Surgical History:   Procedure Laterality Date    ABDOMINAL SURGERY      Lap Estefania    ANKLE SURGERY      Left     SECTION  2018    CHOLECYSTECTOMY      HYSTEROSCOPY  3/8/24    HYSTEROSCOPY W/ POLYPECTOMY  2017    SINUS SURGERY       Family History   Problem Relation Name Age of Onset    Hypertension Father Elijah Galindo     Hypertension Mother Serina Galindo     Hyperlipidemia Mother Serina Galindo     Colon cancer Maternal Uncle      Breast cancer Neg Hx      Cancer Neg Hx      Diabetes Neg Hx      Eclampsia Neg Hx      Miscarriages / Stillbirths Neg Hx      Ovarian cancer Neg Hx       labor Neg Hx      Stroke Neg Hx       Social History[1]  Review  of Systems   Constitutional:  Positive for chills and fever.   HENT:  Positive for congestion and sore throat.    Eyes:  Negative for visual disturbance.   Respiratory:  Positive for cough. Negative for shortness of breath.    Cardiovascular:  Negative for chest pain and palpitations.   Gastrointestinal:  Positive for nausea and vomiting. Negative for abdominal pain, constipation and diarrhea.   Genitourinary:  Positive for vaginal discharge. Negative for dysuria, hematuria and vaginal bleeding.   Neurological:  Negative for dizziness, light-headedness and headaches.       Physical Exam     Initial Vitals   BP Pulse Resp Temp SpO2   02/23/25 1036 02/23/25 1036 02/23/25 1036 02/23/25 1036 02/23/25 1035   111/70 (!) 120 18 98.6 °F (37 °C) 100 %      MAP       --              Temp:  [98.6 °F (37 °C)-99 °F (37.2 °C)] 99 °F (37.2 °C)  Pulse:  [120] 120  Resp:  [18] 18  SpO2:  [100 %] 100 %  BP: (111)/(70) 111/70   Physical Exam    Vitals reviewed.  Constitutional: She appears well-developed and well-nourished.   HENT:   Head: Normocephalic and atraumatic.   Eyes: EOM are normal.   Cardiovascular:  Normal rate, regular rhythm and normal heart sounds.           Pulmonary/Chest: Breath sounds normal. No respiratory distress. She has no wheezes.   Abdominal: Abdomen is soft. There is no abdominal tenderness. There is no rebound and no guarding.   Musculoskeletal:         General: No tenderness or edema.     Neurological: She is alert and oriented to person, place, and time.   Skin: Skin is warm and dry.   Psychiatric: She has a normal mood and affect. Her behavior is normal. Thought content normal.     OB LABOR EXAM:       Method: Sterile speculum exam per MD and Sterile vaginal exam per MD.       Dilatation: 0.   Station: -3.   Effacement: 50%.       Comments: SSE: negative pooling, negative Nitrazine, negative valsalva, negative ferning  SVE: cl/th/hi       ED Course   Procedures  Labs Reviewed   INFLUENZA A & B BY  MOLECULAR - Abnormal       Result Value    Influenza A, Molecular Positive (*)     Influenza B, Molecular Negative      Flu A & B Source Nasal Swab     CBC W/ AUTO DIFFERENTIAL - Abnormal    WBC 5.40      RBC 3.70 (*)     Hemoglobin 11.5 (*)     Hematocrit 33.5 (*)     MCV 91      MCH 31.1 (*)     MCHC 34.3      RDW 12.8      Platelets 159      MPV 10.2      Immature Granulocytes 0.6 (*)     Gran # (ANC) 4.7      Immature Grans (Abs) 0.03      Lymph # 0.3 (*)     Mono # 0.4      Eos # 0.0      Baso # 0.01      nRBC 0      Gran % 87.2 (*)     Lymph % 4.8 (*)     Mono % 7.0      Eosinophil % 0.2      Basophil % 0.2      Differential Method Automated     COMPREHENSIVE METABOLIC PANEL - Abnormal    Sodium 134 (*)     Potassium 3.6      Chloride 106      CO2 21 (*)     Glucose 118 (*)     BUN 5 (*)     Creatinine 0.6      Calcium 8.6 (*)     Total Protein 6.8      Albumin 3.1 (*)     Total Bilirubin 0.3      Alkaline Phosphatase 41      AST 24      ALT 21      eGFR >60      Anion Gap 7 (*)    POCT URINALYSIS W/O SCOPE - Abnormal    Spec Grav UA 1.020      PH, UA 6.0      Protein, UA 30 (*)     Glucose, UA Negative      Ketones, UA 40 (*)     Bilirubin, UA Negative      Blood, UA Negative      Leukocytes, UA Negative      Nitrite, UA Negative      Urobilinogen, UA 0.2      Color, UA POC Yellow      Clarity, UA, POC Clear     GROUP A STREP, MOLECULAR    Group A Strep, Molecular Negative     SARS-COV-2 RNA AMPLIFICATION, QUAL    SARS-CoV-2 RNA, Amplification, Qual Negative     POCT PH OF BODY FLUIDS    pH, Body Fluid neg     POCT FERN TEST, AMNIO    Fern Test Negative            Imaging Results    None          Medications   lactated ringers bolus 1,000 mL (0 mLs Intravenous Stopped 25 1231)     Medical Decision Making  Brielle Sharpe is a 39 y.o. [id number] at 17w3d presents complaining of fevers/chills, nausea/vomiting, cough, and sore throat, onset x2 days ago. Patient reports that her daughter and several of her  classmates all have the flu. The patient took an at home influenza test that was positive. She reports that she began taking Tamiflu last night and has taken 2 doses. Patient reports that she last vomiting at 0000 last night. She denies any current nausea. She reports that she has been febrile, however her fever resolves with tylenol. She last took tylenol at 0730 this AM. Patient is also reporting some possible leakage of fluid that occurred yesterday. She is unsure if this is ROM vs urine vs sweat from fevering.    This IUP is complicated by Influenza, H/O C/S x1, GBS UTI.  Patient denies contractions, denies vaginal bleeding, reports LOF.    Temp:  [98.6 °F (37 °C)-99 °F (37.2 °C)] 99 °F (37.2 °C)  Pulse:  [120] 120  Resp:  [18] 18  SpO2:  [100 %] 100 %  BP: (111)/(70) 111/70     FHTs: verified  SSE: negative pooling, negative Nitrazine, negative valsalva, negative ferning  SVE: cl/th/hi    Flu-like illness  - CBC, CMP, Udip, COVID, Flu, Strep ordered  - 1L LR 2/2 maternal tachycardia  CBC: 5/11/33/159  Cr: 0.6  AST/ALT: 24/21  Udip: + Ketones  Strep, COVID: negative  Influenza A Positive    - Patient instructed that Flu was positive  - Patient has already begun Tamiflu  - Encouraged patient to continue Tamiflu and to add supportive care measures  - Aggressive oral hydration and PRN tylenol for fevers  - Strict ED return precautions given for persistent fever, SOB, CP, unable to tolerate PO  - Patient stable for discharge at this time  - ED return precautions given  - She voiced understanding and is in agreement with the plan     Radha Madrigal MD  Obstetrics and Gynecology - PGY2               Attending Attestation:   Physician Attestation Statement for Resident:  As the supervising MD   Physician Attestation Statement: I have personally seen and examined this patient.   I agree with the above history.  -:   As the supervising MD I agree with the above PE.     As the supervising MD I agree with the above  treatment, course, plan, and disposition.   -:     FHTs normal.  Flu swab positive.  Afebrile in BERT.  Patient currently on tamiflu.  Tachycardia improved with IVF.  Agree with discharge to home.  Continue tamiflu.    Kelly Jasmine MD,NIK  Date and Time: 02/24/2025 5:21 AM  OB Hospitalist      I was personally present during the critical portions of the procedure(s) performed by the resident and was immediately available in the ED to provide services and assistance as needed during the entire procedure.  I have reviewed and agree with the residents interpretation of the following: lab data.  I have reviewed the following: old records at this facility.                                       Clinical Impression:  Final diagnoses:  [J10.1] Influenza A (Primary)  [Z3A.17] 17 weeks gestation of pregnancy  [Z03.71] Encounter for suspected premature rupture of amniotic membranes, with rupture of membranes not found          ED Disposition Condition    Discharge Stable          ED Prescriptions    None       Follow-up Information    None            Radha Madrigal MD  Resident  02/23/25 2007         [1]   Social History  Tobacco Use    Smoking status: Never    Smokeless tobacco: Never   Substance Use Topics    Alcohol use: No    Drug use: No        Kelly Jasmine MD  02/24/25 0521

## 2025-02-23 NOTE — DISCHARGE INSTRUCTIONS
Patient can access instructions in MyChart and knows number to call with questions if symptoms worsen.

## 2025-02-26 ENCOUNTER — TELEPHONE (OUTPATIENT)
Dept: OBSTETRICS AND GYNECOLOGY | Facility: CLINIC | Age: 40
End: 2025-02-26
Payer: COMMERCIAL

## 2025-02-26 ENCOUNTER — ROUTINE PRENATAL (OUTPATIENT)
Dept: OBSTETRICS AND GYNECOLOGY | Facility: CLINIC | Age: 40
End: 2025-02-26
Payer: COMMERCIAL

## 2025-02-26 VITALS
WEIGHT: 116.63 LBS | DIASTOLIC BLOOD PRESSURE: 66 MMHG | SYSTOLIC BLOOD PRESSURE: 116 MMHG | BODY MASS INDEX: 21.33 KG/M2

## 2025-02-26 DIAGNOSIS — Z3A.17 17 WEEKS GESTATION OF PREGNANCY: Primary | ICD-10-CM

## 2025-02-26 DIAGNOSIS — O09.521 AMA (ADVANCED MATERNAL AGE) MULTIGRAVIDA 35+, FIRST TRIMESTER: ICD-10-CM

## 2025-02-26 PROCEDURE — 0502F SUBSEQUENT PRENATAL CARE: CPT | Mod: CPTII,S$GLB,, | Performed by: FAMILY MEDICINE

## 2025-02-26 PROCEDURE — 99999 PR PBB SHADOW E&M-EST. PATIENT-LVL III: CPT | Mod: PBBFAC,,, | Performed by: FAMILY MEDICINE

## 2025-02-26 NOTE — PATIENT INSTRUCTIONS
LABOR AND DELIVERY PHONE NUMBER, [phone] (OPEN 24/7, LOCATED ON 6TH FLOOR OF HOSPITAL)  [address] PHONE NUMBER, [phone] (OPEN MON-FRI, 8a-5p)

## 2025-02-26 NOTE — PROGRESS NOTES
Here for routine OB appt at 17w6d,recently had the flu, feeling better. Felt her abdomen was flatter than before, just wanted to check heart tones. Active fetus via vscan. Denies VB and cramping. No ctx, LOF. Anatomy scheduled, will skip 20w visit. Doing glucose with next, discussed. Pain, bleeding, and PTL precautions given.  F/U scheduled 8 weeks

## 2025-03-11 ENCOUNTER — PROCEDURE VISIT (OUTPATIENT)
Dept: MATERNAL FETAL MEDICINE | Facility: CLINIC | Age: 40
End: 2025-03-11
Attending: OBSTETRICS & GYNECOLOGY
Payer: COMMERCIAL

## 2025-03-11 ENCOUNTER — RESULTS FOLLOW-UP (OUTPATIENT)
Dept: OBSTETRICS AND GYNECOLOGY | Facility: CLINIC | Age: 40
End: 2025-03-11

## 2025-03-11 DIAGNOSIS — Z36.89 ENCOUNTER FOR ULTRASOUND TO ASSESS FETAL GROWTH: ICD-10-CM

## 2025-03-11 DIAGNOSIS — O35.9XX0 FETAL ABNORMALITY AFFECTING MANAGEMENT OF MOTHER, SINGLE OR UNSPECIFIED FETUS: Primary | ICD-10-CM

## 2025-03-11 DIAGNOSIS — Z36.9 ENCOUNTER FOR FETAL ULTRASOUND: ICD-10-CM

## 2025-03-11 PROCEDURE — 76811 OB US DETAILED SNGL FETUS: CPT | Mod: S$GLB,,, | Performed by: OBSTETRICS & GYNECOLOGY

## 2025-03-11 PROCEDURE — 76817 TRANSVAGINAL US OBSTETRIC: CPT | Mod: S$GLB,,, | Performed by: OBSTETRICS & GYNECOLOGY

## 2025-03-13 ENCOUNTER — PATIENT MESSAGE (OUTPATIENT)
Dept: OTHER | Facility: OTHER | Age: 40
End: 2025-03-13
Payer: COMMERCIAL

## 2025-03-14 ENCOUNTER — OFFICE VISIT (OUTPATIENT)
Dept: SURGERY | Facility: CLINIC | Age: 40
End: 2025-03-14
Payer: COMMERCIAL

## 2025-03-14 VITALS
HEIGHT: 62 IN | WEIGHT: 116 LBS | BODY MASS INDEX: 21.35 KG/M2 | DIASTOLIC BLOOD PRESSURE: 80 MMHG | OXYGEN SATURATION: 100 % | HEART RATE: 102 BPM | SYSTOLIC BLOOD PRESSURE: 119 MMHG

## 2025-03-14 DIAGNOSIS — Z12.39 SCREENING BREAST EXAMINATION: ICD-10-CM

## 2025-03-14 DIAGNOSIS — N64.4 BREAST PAIN: Primary | ICD-10-CM

## 2025-03-14 PROCEDURE — 99999 PR PBB SHADOW E&M-EST. PATIENT-LVL III: CPT | Mod: PBBFAC,,, | Performed by: PHYSICIAN ASSISTANT

## 2025-03-14 RX ORDER — CHOLECALCIFEROL (VITAMIN D3) 25 MCG
1000 TABLET ORAL DAILY
COMMUNITY

## 2025-03-14 NOTE — PROGRESS NOTES
[facility]  Department of Surgery    REFERRING:  No referring provider defined for this encounter.  PCP: Mary Brown MD  CHIEF COMPLAINT: left breast pain    Subjective:      Brielle Sharpe is a 39 y.o. premenopausal female referred for evaluation of breast pain. Change was noted a few months ago. Patient states the pain was focal to the UOQ of her breast. She was seen by her OBGYN who ordered an US. US performed 24 which was benign. Patient is currently being seen at [facility] and taking Lupron. Planning embryo transfer in November. Patient does routinely do self breast exams.  Patient has noted a change on breast exam.  Patient denies nipple discharge. Patient denies to previous breast biopsy. Patient denies a personal history of breast cancer.    Interval History 3/14/25:   Patient returns for 6 month follow up for left breast pain. States this issue resolved completely without interventions. Did undergo embryo transfer in November which was successful. Now pregnant with baby girl, 21 weeks. Otherwise doing well without new complaints.     GYN History:  Age of menarche was 12. Patient is . Age of first live birth was 32.      FAMILY history:  Denies significant family history of breast cancer. Does endorse Maternal Uncle with colon cancer.     Past Medical History:   Diagnosis Date    Dyspareunia     Always    Endometriosis of uterus     Just did a receptiva test with [facility]    Infertility, female     Multiple rounds of IVF     Past Surgical History:   Procedure Laterality Date    ABDOMINAL SURGERY      Lap Estefania    ANKLE SURGERY      Left     SECTION  2018    CHOLECYSTECTOMY      HYSTEROSCOPY  3/8/24    HYSTEROSCOPY W/ POLYPECTOMY  2017    SINUS SURGERY       Current Outpatient Medications on File Prior to Visit   Medication Sig Dispense Refill    aspirin 81 MG Chew Take 81 mg by mouth once daily.      ondansetron (ZOFRAN-ODT) 4 MG TbDL Take 1 tablet  (4 mg total) by mouth daily as needed (nausea). 30 tablet 0    prenatal vit/iron fum/folic ac (PRENATAL 1+1 ORAL) Take by mouth.      vitamin D (VITAMIN D3) 1000 units Tab Take 1,000 Units by mouth once daily.      LOVENOX 40 mg/0.4 mL Syrg        No current facility-administered medications on file prior to visit.     Social History     Socioeconomic History    Marital status:    Tobacco Use    Smoking status: Never    Smokeless tobacco: Never   Substance and Sexual Activity    Alcohol use: No    Drug use: No    Sexual activity: Yes     Partners: Male     Birth control/protection: None     Social Drivers of Health     Financial Resource Strain: Low Risk  (5/16/2024)    Overall Financial Resource Strain (CARDIA)     Difficulty of Paying Living Expenses: Not hard at all   Food Insecurity: No Food Insecurity (5/16/2024)    Hunger Vital Sign     Worried About Running Out of Food in the Last Year: Never true     Ran Out of Food in the Last Year: Never true   Transportation Needs: No Transportation Needs (11/26/2023)    Received from [facility] - Transportation     Lack of Transportation (Medical): No     Lack of Transportation (Non-Medical): No   Physical Activity: Inactive (5/16/2024)    Exercise Vital Sign     Days of Exercise per Week: 0 days     Minutes of Exercise per Session: 0 min   Stress: No Stress Concern Present (5/16/2024)    [place] [employer] - Occupational Stress Questionnaire     Feeling of Stress : Only a little   Housing Stability: Unknown (5/16/2024)    Housing Stability Vital Sign     Unable to Pay for Housing in the Last Year: No     Family History   Problem Relation Name Age of Onset    Hypertension Father Elijah Galindo     Hypertension Mother Serina Galindo     Hyperlipidemia Mother Serina Galindo     Colon cancer Maternal Uncle      Breast cancer Neg Hx      Cancer Neg Hx      Diabetes Neg Hx      Eclampsia Neg Hx      Miscarriages / Stillbirths Neg Hx       "Ovarian cancer Neg Hx       labor Neg Hx      Stroke Neg Hx         Review of Systems  Review of Systems   Constitutional:  Negative for chills, fever and malaise/fatigue.   HENT:  Negative for congestion.    Eyes:  Negative for discharge.   Respiratory:  Negative for cough, shortness of breath and stridor.    Cardiovascular:  Negative for chest pain and palpitations.   Gastrointestinal:  Negative for abdominal pain and nausea.   Neurological:  Negative for headaches.   Psychiatric/Behavioral:  The patient is not nervous/anxious.           Objective:        /80 (BP Location: Left arm, Patient Position: Sitting)   Pulse 102   Ht 5' 2" (1.575 m)   Wt 52.6 kg (116 lb)   LMP  (LMP Unknown)   SpO2 100%   BMI 21.22 kg/m²   Physical Exam   Vitals reviewed.  Constitutional: She is oriented to person, place, and time.   HENT:   Head: Normocephalic and atraumatic.   Nose: Nose normal.   Eyes: Pupils are equal, round, and reactive to light. Right eye exhibits no discharge. Left eye exhibits no discharge.   Pulmonary/Chest: Effort normal and breath sounds normal. No stridor. No respiratory distress. She exhibits no mass, no tenderness and no edema. Right breast exhibits no inverted nipple, no mass, no nipple discharge, no skin change and no tenderness. Left breast exhibits no inverted nipple, no mass, no nipple discharge, no skin change and no tenderness. No breast swelling or bleeding. Breasts are symmetrical.   Abdominal: Normal appearance.   Genitourinary: No breast swelling or bleeding.   Neurological: She is alert and oriented to person, place, and time.   Skin: Skin is warm and dry.     Psychiatric: Her behavior is normal. Mood, judgment and thought content normal.     Radiology review: Images personally reviewed by me in the clinic.      24 US Left Breast Limited:     Findings:  Targeted ultrasound of the area the patient pinpointed as the area of concern was performed.  No abnormalities are " present.        Impression:  No sonographic evidence of abnormality. A benign or negative imaging result should not preclude further clinical investigation of clinically suspicious symptoms.       BI-RADS Category 1: Negative Finding     Recommendation:  Annual mammogram is recommended beginning at age 40.      Assessment:      Brielle Sharpe is a 39 y.o. premenopausal female with persistent left breast pain.      Plan:   We discussed the options for management of breast pain.    Discussed continue with OTC therapies such as acetaminophen or nonsteroidal anti-inflammatory drugs (NSAIDs). They can both be used to relieve breast pain. Topical NSAIDS such as OTC Diclofenac (Volteran) gel can be used. Other recommendations include use of a supportive bra. Wearing a soft, supportive bra at night prevents the breasts from pulling down on the chest wall. Some women obtain relief from application of warm compresses or ice packs. Also, referred for professional fitting of a supportive bra.        Breast pain resolved. Discussed breast screening recommendations. Patient turns 40 in the fall. Will be breast feeding at that time. Will be in touch once baby is born to coordinate appropriate imaging.     The patient is in agreement with the plan. Questions were encouraged and answered to patient's satisfaction. Brielle  will call our office with any questions or concerns.

## 2025-04-03 ENCOUNTER — CLINICAL SUPPORT (OUTPATIENT)
Dept: PEDIATRIC CARDIOLOGY | Facility: CLINIC | Age: 40
End: 2025-04-03
Payer: COMMERCIAL

## 2025-04-03 ENCOUNTER — PROCEDURE VISIT (OUTPATIENT)
Dept: MATERNAL FETAL MEDICINE | Facility: CLINIC | Age: 40
End: 2025-04-03
Payer: COMMERCIAL

## 2025-04-03 ENCOUNTER — OFFICE VISIT (OUTPATIENT)
Dept: PEDIATRIC CARDIOLOGY | Facility: CLINIC | Age: 40
End: 2025-04-03
Payer: COMMERCIAL

## 2025-04-03 VITALS
HEART RATE: 99 BPM | SYSTOLIC BLOOD PRESSURE: 117 MMHG | BODY MASS INDEX: 22.31 KG/M2 | WEIGHT: 121.25 LBS | HEIGHT: 62 IN | DIASTOLIC BLOOD PRESSURE: 71 MMHG

## 2025-04-03 DIAGNOSIS — Z36.89 ENCOUNTER FOR ULTRASOUND TO ASSESS FETAL GROWTH: ICD-10-CM

## 2025-04-03 DIAGNOSIS — O35.9XX0 FETAL ABNORMALITY AFFECTING MANAGEMENT OF MOTHER, SINGLE OR UNSPECIFIED FETUS: ICD-10-CM

## 2025-04-03 DIAGNOSIS — Z03.73 SUSPECTED FETAL ANOMALY NOT FOUND: Primary | ICD-10-CM

## 2025-04-03 PROCEDURE — 3078F DIAST BP <80 MM HG: CPT | Mod: CPTII,S$GLB,, | Performed by: PEDIATRICS

## 2025-04-03 PROCEDURE — 3074F SYST BP LT 130 MM HG: CPT | Mod: CPTII,S$GLB,, | Performed by: PEDIATRICS

## 2025-04-03 PROCEDURE — 76816 OB US FOLLOW-UP PER FETUS: CPT | Mod: S$GLB,,, | Performed by: STUDENT IN AN ORGANIZED HEALTH CARE EDUCATION/TRAINING PROGRAM

## 2025-04-03 PROCEDURE — 3008F BODY MASS INDEX DOCD: CPT | Mod: CPTII,S$GLB,, | Performed by: PEDIATRICS

## 2025-04-03 PROCEDURE — 99203 OFFICE O/P NEW LOW 30 MIN: CPT | Mod: 25,S$GLB,, | Performed by: PEDIATRICS

## 2025-04-03 PROCEDURE — 1159F MED LIST DOCD IN RCRD: CPT | Mod: CPTII,S$GLB,, | Performed by: PEDIATRICS

## 2025-04-03 PROCEDURE — 99999 PR PBB SHADOW E&M-EST. PATIENT-LVL I: CPT | Mod: PBBFAC,,,

## 2025-04-03 PROCEDURE — 99999 PR PBB SHADOW E&M-EST. PATIENT-LVL III: CPT | Mod: PBBFAC,,, | Performed by: PEDIATRICS

## 2025-04-03 NOTE — PROGRESS NOTES
[facility] Fetal Medicine Fetal Cardiology Clinic    Today, I had the pleasure of evaluating Brielle Sharpe who is now 39 y.o. and carrying her second pregnancy at 23 weeks gestation with an YANE of 25. She was referred for evaluation of the fetal heart due to pregnancy as a result of IVF with sub-optimal views of the fetal heart during the anatomy scan.      She is carrying a female fetus, yet unnamed.  She has felt the baby move.       Obstetric History:    .  Her OB history is otherwise unremarkable.     Past Medical History:   Diagnosis Date    Dyspareunia     Always    Endometriosis of uterus     Just did a receptiva test with [facility]    Infertility, female     Multiple rounds of IVF       Current Medications[1]     Review of patient's allergies indicates:  No Known Allergies    Family History: Negative for congenital heart disease, early coronary artery disease, sudden unexplained death, connective tissues disorders, genetic syndromes, or other congenital anomalies.    Social History: Ms. Brielle Sharpe is  to the father of the baby. She works in health care as a CRNA.     FETAL ECHOCARDIOGRAM (summary):  Normal fetal echocardiogram.    (Full report in electronic medical record)    Impression:  Single active female fetus at 23 wga.  Normal fetal echocardiogram.      Todays fetal echocardiogram is normal, within the limitations of fetal echocardiography.  I discussed with her that fetal echocardiography is insufficiently sensitive to rule out all septal defects, anomalies of pulmonary and systemic veins, arch anomalies, and some valvar abnormalities, nor can it ensure that the ductus arteriosus and foramen ovale will spontaneously close.     Recommendations:  Location, timing, and mode of delivery will be determined by the obstetrical team.  She does not require further follow-up in the fetal echocardiography clinic, but I would be happy to see her again if additional questions or  concerns arise.    Should there be any concerns about the baby's heart after birth, a post- echocardiogram and cardiology consultation are recommended.           The above information was discussed in detail including the use of diagrams, 30 minutes were used for the evaluation with half of that time in discussion and counseling.         [1]   Current Outpatient Medications:     aspirin 81 MG Chew, Take 81 mg by mouth once daily., Disp: , Rfl:     ondansetron (ZOFRAN-ODT) 4 MG TbDL, Take 1 tablet (4 mg total) by mouth daily as needed (nausea)., Disp: 30 tablet, Rfl: 0    prenatal vit/iron fum/folic ac (PRENATAL 1+1 ORAL), Take by mouth., Disp: , Rfl:     vitamin D (VITAMIN D3) 1000 units Tab, Take 1,000 Units by mouth once daily., Disp: , Rfl:     LOVENOX 40 mg/0.4 mL Syrg, , Disp: , Rfl:

## 2025-04-10 ENCOUNTER — PATIENT MESSAGE (OUTPATIENT)
Dept: OTHER | Facility: OTHER | Age: 40
End: 2025-04-10
Payer: COMMERCIAL

## 2025-04-23 ENCOUNTER — ROUTINE PRENATAL (OUTPATIENT)
Dept: OBSTETRICS AND GYNECOLOGY | Facility: CLINIC | Age: 40
End: 2025-04-23
Payer: COMMERCIAL

## 2025-04-23 ENCOUNTER — RESULTS FOLLOW-UP (OUTPATIENT)
Dept: OBSTETRICS AND GYNECOLOGY | Facility: CLINIC | Age: 40
End: 2025-04-23

## 2025-04-23 ENCOUNTER — LAB VISIT (OUTPATIENT)
Dept: LAB | Facility: OTHER | Age: 40
End: 2025-04-23
Attending: STUDENT IN AN ORGANIZED HEALTH CARE EDUCATION/TRAINING PROGRAM
Payer: COMMERCIAL

## 2025-04-23 VITALS
DIASTOLIC BLOOD PRESSURE: 60 MMHG | WEIGHT: 129.19 LBS | BODY MASS INDEX: 23.62 KG/M2 | SYSTOLIC BLOOD PRESSURE: 112 MMHG

## 2025-04-23 DIAGNOSIS — Z78.9 CONCEIVED BY IN VITRO FERTILIZATION: Primary | ICD-10-CM

## 2025-04-23 DIAGNOSIS — Z78.9 CONCEIVED BY IN VITRO FERTILIZATION: ICD-10-CM

## 2025-04-23 DIAGNOSIS — O09.521 MULTIGRAVIDA OF ADVANCED MATERNAL AGE IN FIRST TRIMESTER: ICD-10-CM

## 2025-04-23 LAB
ABSOLUTE EOSINOPHIL (OHS): 0.27 K/UL
ABSOLUTE MONOCYTE (OHS): 0.57 K/UL (ref 0.3–1)
ABSOLUTE NEUTROPHIL COUNT (OHS): 7.43 K/UL (ref 1.8–7.7)
BASOPHILS # BLD AUTO: 0.03 K/UL
BASOPHILS NFR BLD AUTO: 0.3 %
ERYTHROCYTE [DISTWIDTH] IN BLOOD BY AUTOMATED COUNT: 13.6 % (ref 11.5–14.5)
GLUCOSE SERPL-MCNC: 103 MG/DL (ref 70–140)
HCT VFR BLD AUTO: 35.9 % (ref 37–48.5)
HGB BLD-MCNC: 11.7 GM/DL (ref 12–16)
IMM GRANULOCYTES # BLD AUTO: 0.05 K/UL (ref 0–0.04)
IMM GRANULOCYTES NFR BLD AUTO: 0.5 % (ref 0–0.5)
LYMPHOCYTES # BLD AUTO: 1.11 K/UL (ref 1–4.8)
MCH RBC QN AUTO: 31.5 PG (ref 27–31)
MCHC RBC AUTO-ENTMCNC: 32.6 G/DL (ref 32–36)
MCV RBC AUTO: 97 FL (ref 82–98)
NUCLEATED RBC (/100WBC) (OHS): 0 /100 WBC
PLATELET # BLD AUTO: 199 K/UL (ref 150–450)
PMV BLD AUTO: 10 FL (ref 9.2–12.9)
RBC # BLD AUTO: 3.71 M/UL (ref 4–5.4)
RELATIVE EOSINOPHIL (OHS): 2.9 %
RELATIVE LYMPHOCYTE (OHS): 11.7 % (ref 18–48)
RELATIVE MONOCYTE (OHS): 6 % (ref 4–15)
RELATIVE NEUTROPHIL (OHS): 78.6 % (ref 38–73)
WBC # BLD AUTO: 9.46 K/UL (ref 3.9–12.7)

## 2025-04-23 PROCEDURE — 82950 GLUCOSE TEST: CPT

## 2025-04-23 PROCEDURE — 0502F SUBSEQUENT PRENATAL CARE: CPT | Mod: CPTII,S$GLB,, | Performed by: STUDENT IN AN ORGANIZED HEALTH CARE EDUCATION/TRAINING PROGRAM

## 2025-04-23 PROCEDURE — 85025 COMPLETE CBC W/AUTO DIFF WBC: CPT

## 2025-04-23 PROCEDURE — 36415 COLL VENOUS BLD VENIPUNCTURE: CPT

## 2025-04-23 PROCEDURE — 99999 PR PBB SHADOW E&M-EST. PATIENT-LVL III: CPT | Mod: PBBFAC,,, | Performed by: STUDENT IN AN ORGANIZED HEALTH CARE EDUCATION/TRAINING PROGRAM

## 2025-04-23 NOTE — PROGRESS NOTES
Pt doing well. No complaints  Denies vaginal bleeding, LOF, contractions. Reports regular fetal movement. Denies symptoms of pre E.  VS reviewed.  Glucola underway  Tdap recs reviewed   Pump rx info on AVS  Labor and pre E precautions reviewed.   RTC: 4 weeks

## 2025-04-23 NOTE — PATIENT INSTRUCTIONS
[email or web address]    Tdap or Dtap for close family if not had in the past five years    [facility], Labor and Delivery is on the 6th floor of [facility]: [phone]    [email or web address]      Blood pressures to look out for:  Top number >140 OR bottom number>90  If elevated, wait 10-15minutes and then repeat  If still elevated, reach out to Doctor.  If top number >160 OR bottom >110, repeat  If still that high, proceed straight to the [facility]

## 2025-04-24 ENCOUNTER — PATIENT MESSAGE (OUTPATIENT)
Dept: OTHER | Facility: OTHER | Age: 40
End: 2025-04-24
Payer: COMMERCIAL

## 2025-05-02 ENCOUNTER — TELEPHONE (OUTPATIENT)
Dept: OBSTETRICS AND GYNECOLOGY | Facility: CLINIC | Age: 40
End: 2025-05-02
Payer: COMMERCIAL

## 2025-05-08 ENCOUNTER — PATIENT MESSAGE (OUTPATIENT)
Dept: OTHER | Facility: OTHER | Age: 40
End: 2025-05-08
Payer: COMMERCIAL

## 2025-05-13 ENCOUNTER — CLINICAL SUPPORT (OUTPATIENT)
Dept: OBSTETRICS AND GYNECOLOGY | Facility: CLINIC | Age: 40
End: 2025-05-13
Payer: COMMERCIAL

## 2025-05-13 ENCOUNTER — ROUTINE PRENATAL (OUTPATIENT)
Dept: OBSTETRICS AND GYNECOLOGY | Facility: CLINIC | Age: 40
End: 2025-05-13
Payer: COMMERCIAL

## 2025-05-13 VITALS
DIASTOLIC BLOOD PRESSURE: 71 MMHG | BODY MASS INDEX: 24.23 KG/M2 | SYSTOLIC BLOOD PRESSURE: 111 MMHG | WEIGHT: 132.5 LBS | HEART RATE: 96 BPM

## 2025-05-13 DIAGNOSIS — Z3A.28 28 WEEKS GESTATION OF PREGNANCY: Primary | ICD-10-CM

## 2025-05-13 DIAGNOSIS — Z23 NEED FOR TDAP VACCINATION: Primary | ICD-10-CM

## 2025-05-13 PROCEDURE — 90715 TDAP VACCINE 7 YRS/> IM: CPT | Mod: S$GLB,,, | Performed by: STUDENT IN AN ORGANIZED HEALTH CARE EDUCATION/TRAINING PROGRAM

## 2025-05-13 PROCEDURE — 99999 PR PBB SHADOW E&M-EST. PATIENT-LVL III: CPT | Mod: PBBFAC,,,

## 2025-05-13 PROCEDURE — 90471 IMMUNIZATION ADMIN: CPT | Mod: S$GLB,,, | Performed by: STUDENT IN AN ORGANIZED HEALTH CARE EDUCATION/TRAINING PROGRAM

## 2025-05-13 PROCEDURE — 99999 PR PBB SHADOW E&M-EST. PATIENT-LVL I: CPT | Mod: PBBFAC,,,

## 2025-05-13 NOTE — PATIENT INSTRUCTIONS
Call L & D after hours at [phone] for vaginal bleeding, leakage of fluids, contractions 4-5 in one hour, decreased fetal movements ( 10 kicks in 2 hours), headache not relieved by Tylenol, blurry vision, or temp of 100.4 or greater.  Begin doing fetal kick counts, at least 10 movements in 2 hours starting at 28 weeks gestation.  Keep next clinic appointment.

## 2025-05-13 NOTE — PROGRESS NOTES
Here for routine OB appt at 28w5d. Some GERD. Reports good FM.  Denies LOF, denies VB, denies contractions. Going to [place] this weekend for festival. Looking at hospitals with NICU.   TDAP today  Passed glucola   Growth U/S scheduled   Discussed TUMS and PEPCID for reflux  Discussed staying hydrated at festival   Reviewed warning signs of Labor and Preeclampsia.  Daily FM counts reinforced.  F/U  as scheduled

## 2025-05-13 NOTE — PROGRESS NOTES
After obtaining consent, and per orders of Dr. BALLARD, injection of TDAP Lot [id number] Exp 31 JAN 2027 given in the LD by MORIAH CAMPO. Patient tolerated well and band aid applied. Patient instructed to remain in clinic for 15 minutes afterwards, and to report any adverse reaction to me immediately.

## 2025-05-22 ENCOUNTER — PATIENT MESSAGE (OUTPATIENT)
Dept: OTHER | Facility: OTHER | Age: 40
End: 2025-05-22
Payer: COMMERCIAL

## 2025-06-03 ENCOUNTER — PROCEDURE VISIT (OUTPATIENT)
Dept: MATERNAL FETAL MEDICINE | Facility: CLINIC | Age: 40
End: 2025-06-03
Payer: COMMERCIAL

## 2025-06-03 DIAGNOSIS — Z36.89 ENCOUNTER FOR ULTRASOUND TO ASSESS FETAL GROWTH: ICD-10-CM

## 2025-06-03 DIAGNOSIS — O35.9XX0 FETAL ABNORMALITY AFFECTING MANAGEMENT OF MOTHER, SINGLE OR UNSPECIFIED FETUS: ICD-10-CM

## 2025-06-03 PROCEDURE — 76816 OB US FOLLOW-UP PER FETUS: CPT | Mod: S$GLB,,, | Performed by: OBSTETRICS & GYNECOLOGY

## 2025-06-05 ENCOUNTER — PATIENT MESSAGE (OUTPATIENT)
Dept: OTHER | Facility: OTHER | Age: 40
End: 2025-06-05
Payer: COMMERCIAL

## 2025-06-10 ENCOUNTER — ROUTINE PRENATAL (OUTPATIENT)
Dept: OBSTETRICS AND GYNECOLOGY | Facility: CLINIC | Age: 40
End: 2025-06-10
Payer: COMMERCIAL

## 2025-06-10 ENCOUNTER — LAB VISIT (OUTPATIENT)
Dept: LAB | Facility: HOSPITAL | Age: 40
End: 2025-06-10
Attending: STUDENT IN AN ORGANIZED HEALTH CARE EDUCATION/TRAINING PROGRAM
Payer: COMMERCIAL

## 2025-06-10 VITALS
SYSTOLIC BLOOD PRESSURE: 122 MMHG | WEIGHT: 136.88 LBS | DIASTOLIC BLOOD PRESSURE: 68 MMHG | BODY MASS INDEX: 25.03 KG/M2

## 2025-06-10 DIAGNOSIS — Z34.83 ENCOUNTER FOR SUPERVISION OF OTHER NORMAL PREGNANCY IN THIRD TRIMESTER: Primary | ICD-10-CM

## 2025-06-10 DIAGNOSIS — Z78.9 CONCEIVED BY IN VITRO FERTILIZATION: ICD-10-CM

## 2025-06-10 LAB
ABSOLUTE EOSINOPHIL (OHS): 0.31 K/UL
ABSOLUTE MONOCYTE (OHS): 0.7 K/UL (ref 0.3–1)
ABSOLUTE NEUTROPHIL COUNT (OHS): 7.03 K/UL (ref 1.8–7.7)
BASOPHILS # BLD AUTO: 0.03 K/UL
BASOPHILS NFR BLD AUTO: 0.3 %
ERYTHROCYTE [DISTWIDTH] IN BLOOD BY AUTOMATED COUNT: 12.2 % (ref 11.5–14.5)
HCT VFR BLD AUTO: 34 % (ref 37–48.5)
HGB BLD-MCNC: 11.5 GM/DL (ref 12–16)
HIV 1+2 AB+HIV1 P24 AG SERPL QL IA: NORMAL
IMM GRANULOCYTES # BLD AUTO: 0.07 K/UL (ref 0–0.04)
IMM GRANULOCYTES NFR BLD AUTO: 0.7 % (ref 0–0.5)
LYMPHOCYTES # BLD AUTO: 1.47 K/UL (ref 1–4.8)
MCH RBC QN AUTO: 31.6 PG (ref 27–31)
MCHC RBC AUTO-ENTMCNC: 33.8 G/DL (ref 32–36)
MCV RBC AUTO: 93 FL (ref 82–98)
NUCLEATED RBC (/100WBC) (OHS): 0 /100 WBC
PLATELET # BLD AUTO: 203 K/UL (ref 150–450)
PMV BLD AUTO: 10.4 FL (ref 9.2–12.9)
RBC # BLD AUTO: 3.64 M/UL (ref 4–5.4)
RELATIVE EOSINOPHIL (OHS): 3.2 %
RELATIVE LYMPHOCYTE (OHS): 15.3 % (ref 18–48)
RELATIVE MONOCYTE (OHS): 7.3 % (ref 4–15)
RELATIVE NEUTROPHIL (OHS): 73.2 % (ref 38–73)
T PALLIDUM IGG+IGM SER QL: NORMAL
WBC # BLD AUTO: 9.61 K/UL (ref 3.9–12.7)

## 2025-06-10 PROCEDURE — 86593 SYPHILIS TEST NON-TREP QUANT: CPT

## 2025-06-10 PROCEDURE — 99999 PR PBB SHADOW E&M-EST. PATIENT-LVL II: CPT | Mod: PBBFAC,,, | Performed by: NURSE PRACTITIONER

## 2025-06-10 PROCEDURE — 0502F SUBSEQUENT PRENATAL CARE: CPT | Mod: CPTII,S$GLB,, | Performed by: NURSE PRACTITIONER

## 2025-06-10 PROCEDURE — 87389 HIV-1 AG W/HIV-1&-2 AB AG IA: CPT

## 2025-06-10 PROCEDURE — 85025 COMPLETE CBC W/AUTO DIFF WBC: CPT

## 2025-06-10 PROCEDURE — 36415 COLL VENOUS BLD VENIPUNCTURE: CPT

## 2025-06-10 NOTE — PATIENT INSTRUCTIONS
Baby movement is an indication of your baby's health and wellbeing. A movement may be a kick, stretch or turn!  - Begin counting baby movements after you reach 28 weeks  - Do these counts twice a day - both in the morning and evening   - Several things can affect your baby's activities - baby's sleeping (20-40 minutes at a time), your blood sugar levels, smoking, noise level, drugs, gestational age, placental location, decreasing space in the uterus and serena of day   - Notify your provider if your baby has NOT had 5 movements in 1 hour or 10 movements in 2 hours OR if there is a significant decrease in your baby's movement     Anytime you experience a headache associated with blurry vision or a headache that is not relieved with Tylenol, call your provider. This headache may be caused by elevated blood pressure.     Uterine contractions every 3-5 minutes apart, lasting 45-60 seconds, for 1-2 hours no matter if at rest or walking, please refer to Labor and Delivery     OB/GYN Clinic  Monday through Friday from 8:00 am to 5:00 pm   [phone]    Labor & Delivery Unit  Open 24/7  6th floor [facility]  [phone]

## 2025-06-10 NOTE — Clinical Note
Hi there - I saw Brielle today for MILIND visit today. Looks like she needs to be scheduled for her 36, 38 and 39 wk MILIND visit. Would you mind reaching out to pt to get visits set up! Thanks,  Anh

## 2025-06-10 NOTE — PROGRESS NOTES
Here for routine OB appt at 32w5d, with no complaints.  Reports good FM.  Denies LOF, VB, contractions.  3T labs today. UTD on Tdap. 32 wk growth u/s wnl.   Discussed checking CM 2-3x/week.   Scheduled for once weekly PNT starting at 36 weeks.   Reviewed warning signs of Labor and Preeclampsia.  Daily FM counts reinforced.  F/U scheduled 2 weeks

## 2025-06-11 ENCOUNTER — PATIENT MESSAGE (OUTPATIENT)
Dept: OBSTETRICS AND GYNECOLOGY | Facility: CLINIC | Age: 40
End: 2025-06-11
Payer: COMMERCIAL

## 2025-06-19 ENCOUNTER — ROUTINE PRENATAL (OUTPATIENT)
Dept: OBSTETRICS AND GYNECOLOGY | Facility: CLINIC | Age: 40
End: 2025-06-19
Payer: COMMERCIAL

## 2025-06-19 VITALS
WEIGHT: 139.31 LBS | SYSTOLIC BLOOD PRESSURE: 114 MMHG | DIASTOLIC BLOOD PRESSURE: 60 MMHG | BODY MASS INDEX: 25.48 KG/M2

## 2025-06-19 DIAGNOSIS — O34.219 H/O CESAREAN SECTION COMPLICATING PREGNANCY: ICD-10-CM

## 2025-06-19 DIAGNOSIS — Z34.83 ENCOUNTER FOR SUPERVISION OF OTHER NORMAL PREGNANCY IN THIRD TRIMESTER: Primary | ICD-10-CM

## 2025-06-19 PROBLEM — Z34.93 ENCOUNTER FOR SUPERVISION OF NORMAL PREGNANCY IN THIRD TRIMESTER: Status: ACTIVE | Noted: 2025-06-19

## 2025-06-19 PROCEDURE — 99999 PR PBB SHADOW E&M-EST. PATIENT-LVL III: CPT | Mod: PBBFAC,,, | Performed by: STUDENT IN AN ORGANIZED HEALTH CARE EDUCATION/TRAINING PROGRAM

## 2025-06-19 NOTE — PROGRESS NOTES
Pt doing well. Some palpitations and SOB. 100's on pulse on Connected mom. Denies vaginal bleeding, LOF, contractions. Reports regular fetal movement. Denies symptoms of pre E.  Connected mom reviewed.  Growth scan reviewed : Fetal size is appropriate for gestational age, with the EFW (1891 g) plotting at the 33% and the AC plotting at the 79%.   Reviewed consents  Reviewed routines/expectations on L&D/MBU  Labor and pre E precautions reviewed.   All questions answered  RSV recs reviewed   RTC: 2 weeks

## 2025-06-24 ENCOUNTER — TELEPHONE (OUTPATIENT)
Dept: OBSTETRICS AND GYNECOLOGY | Facility: CLINIC | Age: 40
End: 2025-06-24
Payer: COMMERCIAL

## 2025-06-24 NOTE — TELEPHONE ENCOUNTER
Copied from CRM #[id number]. Topic: Appointments - Appointment Scheduling  >> Jun 23, 2025  8:17 AM Patty wrote:  Name of Who is Calling:  pt    What is the request in detail:pt's C section is scheduled on her portal for June 29. It is supposed to be scheduled for July 29. Please update the correct delivery date       Can the clinic reply by [facility]:      What Number to Call Back if not in [facility]: .[phone] (home)

## 2025-06-26 ENCOUNTER — PATIENT MESSAGE (OUTPATIENT)
Dept: OTHER | Facility: OTHER | Age: 40
End: 2025-06-26
Payer: COMMERCIAL

## 2025-06-27 ENCOUNTER — TELEPHONE (OUTPATIENT)
Dept: OBSTETRICS AND GYNECOLOGY | Facility: CLINIC | Age: 40
End: 2025-06-27
Payer: COMMERCIAL

## 2025-06-27 DIAGNOSIS — Z78.9 CONCEIVED BY IN VITRO FERTILIZATION: Primary | ICD-10-CM

## 2025-06-27 NOTE — TELEPHONE ENCOUNTER
----- Message from Betsy sent at 6/27/2025  9:09 AM CDT -----  Pt called to get a nst order put in so she can get scheduled for next week. The pt states she was told by the provider that she would need them weekly starting on the 30th. Pls call and advise.

## 2025-07-01 ENCOUNTER — HOSPITAL ENCOUNTER (OUTPATIENT)
Dept: PERINATAL CARE | Facility: OTHER | Age: 40
Discharge: HOME | End: 2025-07-01
Attending: STUDENT IN AN ORGANIZED HEALTH CARE EDUCATION/TRAINING PROGRAM
Payer: COMMERCIAL

## 2025-07-01 DIAGNOSIS — Z78.9 CONCEIVED BY IN VITRO FERTILIZATION: Primary | ICD-10-CM

## 2025-07-01 DIAGNOSIS — Z78.9 CONCEIVED BY IN VITRO FERTILIZATION: ICD-10-CM

## 2025-07-01 PROCEDURE — 59025 FETAL NON-STRESS TEST: CPT | Mod: 26,,, | Performed by: OBSTETRICS & GYNECOLOGY

## 2025-07-01 PROCEDURE — 76815 OB US LIMITED FETUS(S): CPT

## 2025-07-01 PROCEDURE — 76815 OB US LIMITED FETUS(S): CPT | Mod: 26,,, | Performed by: OBSTETRICS & GYNECOLOGY

## 2025-07-01 PROCEDURE — 59025 FETAL NON-STRESS TEST: CPT

## 2025-07-08 NOTE — PROGRESS NOTES
Pt doing well. Feels the baby is low. Denies vaginal bleeding, LOF, contractions. Reports regular fetal movement. Denies symptoms of pre E.  VS reviewed.  RSV vaccine today  Labor and pre E precautions reviewed.   RTC: 2 weeks

## 2025-07-09 ENCOUNTER — CLINICAL SUPPORT (OUTPATIENT)
Dept: OBSTETRICS AND GYNECOLOGY | Facility: CLINIC | Age: 40
End: 2025-07-09
Payer: COMMERCIAL

## 2025-07-09 ENCOUNTER — ROUTINE PRENATAL (OUTPATIENT)
Dept: OBSTETRICS AND GYNECOLOGY | Facility: CLINIC | Age: 40
End: 2025-07-09
Payer: COMMERCIAL

## 2025-07-09 ENCOUNTER — HOSPITAL ENCOUNTER (OUTPATIENT)
Dept: PERINATAL CARE | Facility: OTHER | Age: 40
Discharge: HOME | End: 2025-07-09
Attending: STUDENT IN AN ORGANIZED HEALTH CARE EDUCATION/TRAINING PROGRAM
Payer: COMMERCIAL

## 2025-07-09 VITALS
WEIGHT: 142.88 LBS | SYSTOLIC BLOOD PRESSURE: 110 MMHG | DIASTOLIC BLOOD PRESSURE: 70 MMHG | BODY MASS INDEX: 26.12 KG/M2

## 2025-07-09 DIAGNOSIS — O23.43 GROUP B STREPTOCOCCUS URINARY TRACT INFECTION AFFECTING PREGNANCY IN THIRD TRIMESTER: Primary | ICD-10-CM

## 2025-07-09 DIAGNOSIS — Z78.9 CONCEIVED BY IN VITRO FERTILIZATION: ICD-10-CM

## 2025-07-09 DIAGNOSIS — Z29.11 NEED FOR RSV VACCINATION: Primary | ICD-10-CM

## 2025-07-09 DIAGNOSIS — B95.1 GROUP B STREPTOCOCCUS URINARY TRACT INFECTION AFFECTING PREGNANCY IN THIRD TRIMESTER: Primary | ICD-10-CM

## 2025-07-09 PROCEDURE — 90678 RSV VACC PREF BIVALENT IM: CPT | Mod: S$GLB,,, | Performed by: STUDENT IN AN ORGANIZED HEALTH CARE EDUCATION/TRAINING PROGRAM

## 2025-07-09 PROCEDURE — 76815 OB US LIMITED FETUS(S): CPT | Mod: 26,,, | Performed by: STUDENT IN AN ORGANIZED HEALTH CARE EDUCATION/TRAINING PROGRAM

## 2025-07-09 PROCEDURE — 99999 PR PBB SHADOW E&M-EST. PATIENT-LVL I: CPT | Mod: PBBFAC,,,

## 2025-07-09 PROCEDURE — 59025 FETAL NON-STRESS TEST: CPT | Mod: 26,,, | Performed by: STUDENT IN AN ORGANIZED HEALTH CARE EDUCATION/TRAINING PROGRAM

## 2025-07-09 PROCEDURE — 59025 FETAL NON-STRESS TEST: CPT

## 2025-07-09 PROCEDURE — 90471 IMMUNIZATION ADMIN: CPT | Mod: S$GLB,,, | Performed by: STUDENT IN AN ORGANIZED HEALTH CARE EDUCATION/TRAINING PROGRAM

## 2025-07-09 PROCEDURE — 99999 PR PBB SHADOW E&M-EST. PATIENT-LVL III: CPT | Mod: PBBFAC,,, | Performed by: STUDENT IN AN ORGANIZED HEALTH CARE EDUCATION/TRAINING PROGRAM

## 2025-07-09 PROCEDURE — 0502F SUBSEQUENT PRENATAL CARE: CPT | Mod: CPTII,S$GLB,, | Performed by: STUDENT IN AN ORGANIZED HEALTH CARE EDUCATION/TRAINING PROGRAM

## 2025-07-09 PROCEDURE — 76815 OB US LIMITED FETUS(S): CPT

## 2025-07-16 ENCOUNTER — HOSPITAL ENCOUNTER (OUTPATIENT)
Dept: PERINATAL CARE | Facility: OTHER | Age: 40
Discharge: HOME | End: 2025-07-16
Attending: STUDENT IN AN ORGANIZED HEALTH CARE EDUCATION/TRAINING PROGRAM
Payer: COMMERCIAL

## 2025-07-16 ENCOUNTER — ROUTINE PRENATAL (OUTPATIENT)
Dept: OBSTETRICS AND GYNECOLOGY | Facility: CLINIC | Age: 40
End: 2025-07-16
Payer: COMMERCIAL

## 2025-07-16 VITALS
SYSTOLIC BLOOD PRESSURE: 100 MMHG | DIASTOLIC BLOOD PRESSURE: 74 MMHG | BODY MASS INDEX: 26.57 KG/M2 | WEIGHT: 145.31 LBS

## 2025-07-16 DIAGNOSIS — O34.219 H/O CESAREAN SECTION COMPLICATING PREGNANCY: Primary | ICD-10-CM

## 2025-07-16 DIAGNOSIS — Z78.9 CONCEIVED BY IN VITRO FERTILIZATION: ICD-10-CM

## 2025-07-16 PROCEDURE — 59025 FETAL NON-STRESS TEST: CPT | Mod: 26,,, | Performed by: STUDENT IN AN ORGANIZED HEALTH CARE EDUCATION/TRAINING PROGRAM

## 2025-07-16 PROCEDURE — 99999 PR PBB SHADOW E&M-EST. PATIENT-LVL III: CPT | Mod: PBBFAC,,, | Performed by: STUDENT IN AN ORGANIZED HEALTH CARE EDUCATION/TRAINING PROGRAM

## 2025-07-16 PROCEDURE — 76815 OB US LIMITED FETUS(S): CPT

## 2025-07-16 PROCEDURE — 0502F SUBSEQUENT PRENATAL CARE: CPT | Mod: CPTII,S$GLB,, | Performed by: STUDENT IN AN ORGANIZED HEALTH CARE EDUCATION/TRAINING PROGRAM

## 2025-07-16 PROCEDURE — 59025 FETAL NON-STRESS TEST: CPT

## 2025-07-16 PROCEDURE — 76815 OB US LIMITED FETUS(S): CPT | Mod: 26,,, | Performed by: STUDENT IN AN ORGANIZED HEALTH CARE EDUCATION/TRAINING PROGRAM

## 2025-07-16 NOTE — PROGRESS NOTES
Pt doing well. Denies vaginal bleeding, LOF, contractions. Reports regular fetal movement. Denies symptoms of pre E.  VS reviewed.  Labor and pre E precautions reviewed.   RTC: 1-2 weeks

## 2025-07-23 ENCOUNTER — HOSPITAL ENCOUNTER (OUTPATIENT)
Dept: PERINATAL CARE | Facility: OTHER | Age: 40
Discharge: HOME | End: 2025-07-23
Attending: STUDENT IN AN ORGANIZED HEALTH CARE EDUCATION/TRAINING PROGRAM
Payer: COMMERCIAL

## 2025-07-23 DIAGNOSIS — Z78.9 CONCEIVED BY IN VITRO FERTILIZATION: ICD-10-CM

## 2025-07-23 PROCEDURE — 76815 OB US LIMITED FETUS(S): CPT

## 2025-07-23 PROCEDURE — 59025 FETAL NON-STRESS TEST: CPT

## 2025-07-23 PROCEDURE — 59025 FETAL NON-STRESS TEST: CPT | Mod: 26,,, | Performed by: OBSTETRICS & GYNECOLOGY

## 2025-07-23 PROCEDURE — 76815 OB US LIMITED FETUS(S): CPT | Mod: 26,,, | Performed by: OBSTETRICS & GYNECOLOGY

## 2025-07-28 ENCOUNTER — ANESTHESIA EVENT (OUTPATIENT)
Dept: OBSTETRICS AND GYNECOLOGY | Facility: OTHER | Age: 40
End: 2025-07-28
Payer: COMMERCIAL

## 2025-07-29 ENCOUNTER — HOSPITAL ENCOUNTER (INPATIENT)
Facility: OTHER | Age: 40
LOS: 2 days | Discharge: HOME | End: 2025-07-31
Attending: STUDENT IN AN ORGANIZED HEALTH CARE EDUCATION/TRAINING PROGRAM | Admitting: STUDENT IN AN ORGANIZED HEALTH CARE EDUCATION/TRAINING PROGRAM
Payer: COMMERCIAL

## 2025-07-29 ENCOUNTER — ANESTHESIA (OUTPATIENT)
Dept: OBSTETRICS AND GYNECOLOGY | Facility: OTHER | Age: 40
End: 2025-07-29
Payer: COMMERCIAL

## 2025-07-29 DIAGNOSIS — Z34.90 PREGNANCY: ICD-10-CM

## 2025-07-29 DIAGNOSIS — O34.219 H/O CESAREAN SECTION COMPLICATING PREGNANCY: Primary | ICD-10-CM

## 2025-07-29 PROBLEM — L29.2 PRURITUS OF VULVA: Status: RESOLVED | Noted: 2017-06-09 | Resolved: 2025-07-29

## 2025-07-29 LAB
ABSOLUTE EOSINOPHIL (OHS): 0.45 K/UL
ABSOLUTE MONOCYTE (OHS): 0.73 K/UL (ref 0.3–1)
ABSOLUTE NEUTROPHIL COUNT (OHS): 5.63 K/UL (ref 1.8–7.7)
BASOPHILS # BLD AUTO: 0.05 K/UL
BASOPHILS NFR BLD AUTO: 0.6 %
ERYTHROCYTE [DISTWIDTH] IN BLOOD BY AUTOMATED COUNT: 13 % (ref 11.5–14.5)
HCT VFR BLD AUTO: 35.6 % (ref 37–48.5)
HGB BLD-MCNC: 12.2 GM/DL (ref 12–16)
HOLD SPECIMEN: NORMAL
IMM GRANULOCYTES # BLD AUTO: 0.03 K/UL (ref 0–0.04)
IMM GRANULOCYTES NFR BLD AUTO: 0.4 % (ref 0–0.5)
INDIRECT COOMBS: NORMAL
LYMPHOCYTES # BLD AUTO: 1.37 K/UL (ref 1–4.8)
MCH RBC QN AUTO: 31.5 PG (ref 27–31)
MCHC RBC AUTO-ENTMCNC: 34.3 G/DL (ref 32–36)
MCV RBC AUTO: 92 FL (ref 82–98)
NUCLEATED RBC (/100WBC) (OHS): 0 /100 WBC
PLATELET # BLD AUTO: 184 K/UL (ref 150–450)
PMV BLD AUTO: 10.1 FL (ref 9.2–12.9)
RBC # BLD AUTO: 3.87 M/UL (ref 4–5.4)
RELATIVE EOSINOPHIL (OHS): 5.4 %
RELATIVE LYMPHOCYTE (OHS): 16.6 % (ref 18–48)
RELATIVE MONOCYTE (OHS): 8.8 % (ref 4–15)
RELATIVE NEUTROPHIL (OHS): 68.2 % (ref 38–73)
RH BLD: NORMAL
SPECIMEN OUTDATE: NORMAL
T PALLIDUM IGG+IGM SER QL: NORMAL
WBC # BLD AUTO: 8.26 K/UL (ref 3.9–12.7)

## 2025-07-29 PROCEDURE — 63600175 PHARM REV CODE 636 W HCPCS: Mod: JZ,TB

## 2025-07-29 PROCEDURE — 63600175 PHARM REV CODE 636 W HCPCS: Performed by: STUDENT IN AN ORGANIZED HEALTH CARE EDUCATION/TRAINING PROGRAM

## 2025-07-29 PROCEDURE — 85025 COMPLETE CBC W/AUTO DIFF WBC: CPT | Performed by: STUDENT IN AN ORGANIZED HEALTH CARE EDUCATION/TRAINING PROGRAM

## 2025-07-29 PROCEDURE — 86593 SYPHILIS TEST NON-TREP QUANT: CPT | Performed by: STUDENT IN AN ORGANIZED HEALTH CARE EDUCATION/TRAINING PROGRAM

## 2025-07-29 PROCEDURE — 71000039 HC RECOVERY, EACH ADD'L HOUR: Performed by: STUDENT IN AN ORGANIZED HEALTH CARE EDUCATION/TRAINING PROGRAM

## 2025-07-29 PROCEDURE — 86850 RBC ANTIBODY SCREEN: CPT | Performed by: STUDENT IN AN ORGANIZED HEALTH CARE EDUCATION/TRAINING PROGRAM

## 2025-07-29 PROCEDURE — 11000001 HC ACUTE MED/SURG PRIVATE ROOM

## 2025-07-29 PROCEDURE — 51702 INSERT TEMP BLADDER CATH: CPT

## 2025-07-29 PROCEDURE — 25000003 PHARM REV CODE 250

## 2025-07-29 PROCEDURE — 63600175 PHARM REV CODE 636 W HCPCS

## 2025-07-29 PROCEDURE — 37000008 HC ANESTHESIA 1ST 15 MINUTES: Performed by: STUDENT IN AN ORGANIZED HEALTH CARE EDUCATION/TRAINING PROGRAM

## 2025-07-29 PROCEDURE — 25000003 PHARM REV CODE 250: Performed by: STUDENT IN AN ORGANIZED HEALTH CARE EDUCATION/TRAINING PROGRAM

## 2025-07-29 PROCEDURE — 37000009 HC ANESTHESIA EA ADD 15 MINS: Performed by: STUDENT IN AN ORGANIZED HEALTH CARE EDUCATION/TRAINING PROGRAM

## 2025-07-29 PROCEDURE — 36004724 HC OB OR TIME LEV III - 1ST 15 MIN: Performed by: STUDENT IN AN ORGANIZED HEALTH CARE EDUCATION/TRAINING PROGRAM

## 2025-07-29 PROCEDURE — 71000033 HC RECOVERY, INTIAL HOUR: Performed by: STUDENT IN AN ORGANIZED HEALTH CARE EDUCATION/TRAINING PROGRAM

## 2025-07-29 PROCEDURE — 36004725 HC OB OR TIME LEV III - EA ADD 15 MIN: Performed by: STUDENT IN AN ORGANIZED HEALTH CARE EDUCATION/TRAINING PROGRAM

## 2025-07-29 RX ORDER — KETOROLAC TROMETHAMINE 30 MG/ML
INJECTION, SOLUTION INTRAMUSCULAR; INTRAVENOUS
Status: DISCONTINUED | OUTPATIENT
Start: 2025-07-29 | End: 2025-07-29

## 2025-07-29 RX ORDER — METHYLERGONOVINE MALEATE 0.2 MG/ML
200 INJECTION INTRAVENOUS ONCE AS NEEDED
Status: DISCONTINUED | OUTPATIENT
Start: 2025-07-29 | End: 2025-07-31 | Stop reason: HOSPADM

## 2025-07-29 RX ORDER — DOCUSATE SODIUM 100 MG/1
200 CAPSULE, LIQUID FILLED ORAL 2 TIMES DAILY
Status: DISCONTINUED | OUTPATIENT
Start: 2025-07-29 | End: 2025-07-31 | Stop reason: HOSPADM

## 2025-07-29 RX ORDER — FAMOTIDINE 10 MG/ML
20 INJECTION, SOLUTION INTRAVENOUS
Status: COMPLETED | OUTPATIENT
Start: 2025-07-29 | End: 2025-07-29

## 2025-07-29 RX ORDER — MISOPROSTOL 200 UG/1
800 TABLET ORAL ONCE AS NEEDED
Status: DISCONTINUED | OUTPATIENT
Start: 2025-07-29 | End: 2025-07-31 | Stop reason: HOSPADM

## 2025-07-29 RX ORDER — SODIUM CHLORIDE, SODIUM LACTATE, POTASSIUM CHLORIDE, CALCIUM CHLORIDE 600; 310; 30; 20 MG/100ML; MG/100ML; MG/100ML; MG/100ML
INJECTION, SOLUTION INTRAVENOUS CONTINUOUS
Status: DISCONTINUED | OUTPATIENT
Start: 2025-07-29 | End: 2025-07-31

## 2025-07-29 RX ORDER — FENTANYL CITRATE 50 UG/ML
INJECTION, SOLUTION INTRAMUSCULAR; INTRAVENOUS
Status: DISCONTINUED | OUTPATIENT
Start: 2025-07-29 | End: 2025-07-29

## 2025-07-29 RX ORDER — CARBOPROST TROMETHAMINE 250 UG/ML
250 INJECTION, SOLUTION INTRAMUSCULAR
Status: DISCONTINUED | OUTPATIENT
Start: 2025-07-29 | End: 2025-07-31 | Stop reason: HOSPADM

## 2025-07-29 RX ORDER — OXYCODONE HYDROCHLORIDE 5 MG/1
5 TABLET ORAL EVERY 4 HOURS PRN
Status: DISCONTINUED | OUTPATIENT
Start: 2025-07-29 | End: 2025-07-31 | Stop reason: HOSPADM

## 2025-07-29 RX ORDER — KETOROLAC TROMETHAMINE 30 MG/ML
30 INJECTION, SOLUTION INTRAMUSCULAR; INTRAVENOUS
Status: COMPLETED | OUTPATIENT
Start: 2025-07-29 | End: 2025-07-30

## 2025-07-29 RX ORDER — AMOXICILLIN 250 MG
1 CAPSULE ORAL NIGHTLY PRN
Status: DISCONTINUED | OUTPATIENT
Start: 2025-07-29 | End: 2025-07-31 | Stop reason: HOSPADM

## 2025-07-29 RX ORDER — HYDROCORTISONE 25 MG/G
CREAM TOPICAL 3 TIMES DAILY PRN
Status: DISCONTINUED | OUTPATIENT
Start: 2025-07-29 | End: 2025-07-31 | Stop reason: HOSPADM

## 2025-07-29 RX ORDER — ONDANSETRON HYDROCHLORIDE 2 MG/ML
INJECTION, SOLUTION INTRAMUSCULAR; INTRAVENOUS
Status: DISCONTINUED | OUTPATIENT
Start: 2025-07-29 | End: 2025-07-29

## 2025-07-29 RX ORDER — ACETAMINOPHEN 500 MG
1000 TABLET ORAL
Status: COMPLETED | OUTPATIENT
Start: 2025-07-29 | End: 2025-07-29

## 2025-07-29 RX ORDER — ONDANSETRON 8 MG/1
8 TABLET, ORALLY DISINTEGRATING ORAL EVERY 8 HOURS PRN
Status: DISCONTINUED | OUTPATIENT
Start: 2025-07-29 | End: 2025-07-31 | Stop reason: HOSPADM

## 2025-07-29 RX ORDER — DIPHENHYDRAMINE HCL 25 MG
25 CAPSULE ORAL EVERY 6 HOURS PRN
Status: DISCONTINUED | OUTPATIENT
Start: 2025-07-29 | End: 2025-07-31 | Stop reason: HOSPADM

## 2025-07-29 RX ORDER — DIPHENHYDRAMINE HYDROCHLORIDE 50 MG/ML
12.5 INJECTION, SOLUTION INTRAMUSCULAR; INTRAVENOUS
Status: DISCONTINUED | OUTPATIENT
Start: 2025-07-29 | End: 2025-07-31 | Stop reason: HOSPADM

## 2025-07-29 RX ORDER — SODIUM CITRATE AND CITRIC ACID MONOHYDRATE 334; 500 MG/5ML; MG/5ML
30 SOLUTION ORAL
Status: COMPLETED | OUTPATIENT
Start: 2025-07-29 | End: 2025-07-29

## 2025-07-29 RX ORDER — MORPHINE SULFATE 0.5 MG/ML
INJECTION, SOLUTION EPIDURAL; INTRATHECAL; INTRAVENOUS
Status: DISCONTINUED | OUTPATIENT
Start: 2025-07-29 | End: 2025-07-29

## 2025-07-29 RX ORDER — NALBUPHINE HYDROCHLORIDE 10 MG/ML
5 INJECTION INTRAMUSCULAR; INTRAVENOUS; SUBCUTANEOUS ONCE AS NEEDED
Status: DISCONTINUED | OUTPATIENT
Start: 2025-07-29 | End: 2025-07-31 | Stop reason: HOSPADM

## 2025-07-29 RX ORDER — PRENATAL WITH FERROUS FUM AND FOLIC ACID 3080; 920; 120; 400; 22; 1.84; 3; 20; 10; 1; 12; 200; 27; 25; 2 [IU]/1; [IU]/1; MG/1; [IU]/1; MG/1; MG/1; MG/1; MG/1; MG/1; MG/1; UG/1; MG/1; MG/1; MG/1; MG/1
1 TABLET ORAL DAILY
Status: DISCONTINUED | OUTPATIENT
Start: 2025-07-29 | End: 2025-07-31 | Stop reason: HOSPADM

## 2025-07-29 RX ORDER — BUPIVACAINE HYDROCHLORIDE 7.5 MG/ML
INJECTION INTRAVENOUS
Status: DISCONTINUED | OUTPATIENT
Start: 2025-07-29 | End: 2025-07-29

## 2025-07-29 RX ORDER — SODIUM CHLORIDE, SODIUM LACTATE, POTASSIUM CHLORIDE, CALCIUM CHLORIDE 600; 310; 30; 20 MG/100ML; MG/100ML; MG/100ML; MG/100ML
INJECTION, SOLUTION INTRAVENOUS CONTINUOUS
Status: DISCONTINUED | OUTPATIENT
Start: 2025-07-30 | End: 2025-07-31

## 2025-07-29 RX ORDER — SIMETHICONE 80 MG
1 TABLET,CHEWABLE ORAL EVERY 6 HOURS PRN
Status: DISCONTINUED | OUTPATIENT
Start: 2025-07-29 | End: 2025-07-31 | Stop reason: HOSPADM

## 2025-07-29 RX ORDER — TRANEXAMIC ACID 10 MG/ML
1000 INJECTION, SOLUTION INTRAVENOUS EVERY 30 MIN PRN
Status: DISCONTINUED | OUTPATIENT
Start: 2025-07-29 | End: 2025-07-31 | Stop reason: HOSPADM

## 2025-07-29 RX ORDER — ACETAMINOPHEN 325 MG/1
650 TABLET ORAL
Status: DISCONTINUED | OUTPATIENT
Start: 2025-07-29 | End: 2025-07-31 | Stop reason: HOSPADM

## 2025-07-29 RX ORDER — PROCHLORPERAZINE EDISYLATE 5 MG/ML
5 INJECTION INTRAMUSCULAR; INTRAVENOUS EVERY 6 HOURS PRN
Status: DISCONTINUED | OUTPATIENT
Start: 2025-07-29 | End: 2025-07-31 | Stop reason: HOSPADM

## 2025-07-29 RX ORDER — DEXAMETHASONE SODIUM PHOSPHATE 4 MG/ML
INJECTION, SOLUTION INTRA-ARTICULAR; INTRALESIONAL; INTRAMUSCULAR; INTRAVENOUS; SOFT TISSUE
Status: DISCONTINUED | OUTPATIENT
Start: 2025-07-29 | End: 2025-07-29

## 2025-07-29 RX ORDER — CEFAZOLIN SODIUM 1 G/3ML
INJECTION, POWDER, FOR SOLUTION INTRAMUSCULAR; INTRAVENOUS
Status: DISCONTINUED | OUTPATIENT
Start: 2025-07-29 | End: 2025-07-29

## 2025-07-29 RX ORDER — OXYTOCIN-SODIUM CHLORIDE 0.9% IV SOLN 30 UNIT/500ML 30-0.9/5 UT/ML-%
95 SOLUTION INTRAVENOUS CONTINUOUS PRN
Status: DISCONTINUED | OUTPATIENT
Start: 2025-07-29 | End: 2025-07-31 | Stop reason: HOSPADM

## 2025-07-29 RX ORDER — IBUPROFEN 400 MG/1
800 TABLET, FILM COATED ORAL
Status: DISCONTINUED | OUTPATIENT
Start: 2025-07-30 | End: 2025-07-31 | Stop reason: HOSPADM

## 2025-07-29 RX ORDER — SODIUM CHLORIDE, SODIUM LACTATE, POTASSIUM CHLORIDE, CALCIUM CHLORIDE 600; 310; 30; 20 MG/100ML; MG/100ML; MG/100ML; MG/100ML
INJECTION, SOLUTION INTRAVENOUS CONTINUOUS PRN
Status: DISCONTINUED | OUTPATIENT
Start: 2025-07-29 | End: 2025-07-29

## 2025-07-29 RX ORDER — OXYCODONE HYDROCHLORIDE 10 MG/1
10 TABLET ORAL EVERY 4 HOURS PRN
Status: DISCONTINUED | OUTPATIENT
Start: 2025-07-29 | End: 2025-07-31 | Stop reason: HOSPADM

## 2025-07-29 RX ORDER — ONDANSETRON HYDROCHLORIDE 2 MG/ML
4 INJECTION, SOLUTION INTRAVENOUS EVERY 6 HOURS PRN
Status: DISCONTINUED | OUTPATIENT
Start: 2025-07-29 | End: 2025-07-31 | Stop reason: HOSPADM

## 2025-07-29 RX ADMIN — FENTANYL CITRATE 10 MCG: 50 INJECTION, SOLUTION INTRAMUSCULAR; INTRAVENOUS at 08:07

## 2025-07-29 RX ADMIN — DOCUSATE SODIUM 200 MG: 100 CAPSULE, LIQUID FILLED ORAL at 08:07

## 2025-07-29 RX ADMIN — SODIUM CITRATE AND CITRIC ACID MONOHYDRATE 30 ML: 500; 334 SOLUTION ORAL at 06:07

## 2025-07-29 RX ADMIN — ACETAMINOPHEN 650 MG: 325 TABLET ORAL at 11:07

## 2025-07-29 RX ADMIN — KETOROLAC TROMETHAMINE 30 MG: 30 INJECTION, SOLUTION INTRAMUSCULAR; INTRAVENOUS at 08:07

## 2025-07-29 RX ADMIN — ACETAMINOPHEN 650 MG: 325 TABLET ORAL at 06:07

## 2025-07-29 RX ADMIN — ONDANSETRON 4 MG: 2 INJECTION INTRAMUSCULAR; INTRAVENOUS at 08:07

## 2025-07-29 RX ADMIN — Medication 0.1 MG: at 08:07

## 2025-07-29 RX ADMIN — Medication 95 MILLI-UNITS/MIN: at 09:07

## 2025-07-29 RX ADMIN — PHENYLEPHRINE HYDROCHLORIDE 0.5 MCG/KG/MIN: 10 INJECTION INTRAVENOUS at 08:07

## 2025-07-29 RX ADMIN — KETOROLAC TROMETHAMINE 30 MG: 30 INJECTION, SOLUTION INTRAMUSCULAR at 01:07

## 2025-07-29 RX ADMIN — FAMOTIDINE 20 MG: 10 INJECTION, SOLUTION INTRAVENOUS at 06:07

## 2025-07-29 RX ADMIN — CEFAZOLIN 2 G: 330 INJECTION, POWDER, FOR SOLUTION INTRAMUSCULAR; INTRAVENOUS at 07:07

## 2025-07-29 RX ADMIN — ACETAMINOPHEN 1000 MG: 500 TABLET ORAL at 06:07

## 2025-07-29 RX ADMIN — KETOROLAC TROMETHAMINE 30 MG: 30 INJECTION, SOLUTION INTRAMUSCULAR at 09:07

## 2025-07-29 RX ADMIN — BUPIVACAINE HYDROCHLORIDE IN DEXTROSE 1.6 ML: 7.5 INJECTION, SOLUTION SUBARACHNOID at 08:07

## 2025-07-29 RX ADMIN — SODIUM CHLORIDE, SODIUM LACTATE, POTASSIUM CHLORIDE, AND CALCIUM CHLORIDE: 600; 310; 30; 20 INJECTION, SOLUTION INTRAVENOUS at 08:07

## 2025-07-29 RX ADMIN — SODIUM CHLORIDE, SODIUM LACTATE, POTASSIUM CHLORIDE, AND CALCIUM CHLORIDE: 600; 310; 30; 20 INJECTION, SOLUTION INTRAVENOUS at 07:07

## 2025-07-29 RX ADMIN — DEXAMETHASONE SODIUM PHOSPHATE 4 MG: 4 INJECTION INTRA-ARTICULAR; INTRALESIONAL; INTRAMUSCULAR; INTRAVENOUS; SOFT TISSUE at 08:07

## 2025-07-29 RX ADMIN — ACETAMINOPHEN 650 MG: 325 TABLET ORAL at 12:07

## 2025-07-29 RX ADMIN — SODIUM CHLORIDE, POTASSIUM CHLORIDE, SODIUM LACTATE AND CALCIUM CHLORIDE: 600; 310; 30; 20 INJECTION, SOLUTION INTRAVENOUS at 05:07

## 2025-07-29 NOTE — ANESTHESIA PREPROCEDURE EVALUATION
"[facility]  Anesthesia Pre-Operative Evaluation         Patient Name: Brielle Sharpe  YOB: 1985  MRN: [id number]    2025      Brielle Sharpe is a 39 y.o. female  @ 39w5d who presents for elective twin caesarean c/b AMA, prior caesarean, faint positive APLS    OB History    Para Term  AB Living   2 1 1   2   SAB IAB Ectopic Multiple Live Births      1 2      # Outcome Date GA Lbr Sravan/2nd Weight Sex Type Anes PTL Lv   2 Current            1A Term 18 37w4d  3.005 kg (6 lb 10 oz) M CS-LTranv  N ROMAN   1B Term 18 37w4d  2.693 kg (5 lb 15 oz) F CS-LTranv  N ROMAN       Review of patient's allergies indicates:  No Known Allergies    Wt Readings from Last 1 Encounters:   25 1028 65.9 kg (145 lb 4.5 oz)       BP Readings from Last 3 Encounters:   25 100/74   25 110/70   25 114/60       Problem List[1]    Past Surgical History:   Procedure Laterality Date    ABDOMINAL SURGERY      Lap Estefania    ANKLE SURGERY      Left     SECTION  2018    CHOLECYSTECTOMY      FRACTURE SURGERY  ,     HYSTEROSCOPY  3/8/24    HYSTEROSCOPY W/ POLYPECTOMY  2017    SINUS SURGERY         Social History[2]      Chemistry        Component Value Date/Time     (L) 2025 1112    K 3.6 2025 1112     2025 1112    CO2 21 (L) 2025 1112    BUN 5 (L) 2025 1112    CREATININE 0.6 2025 1112     (H) 2025 1112        Component Value Date/Time    CALCIUM 8.6 (L) 2025 1112    ALKPHOS 41 2025 1112    AST 24 2025 1112    ALT 21 2025 1112    BILITOT 0.3 2025 1112            Lab Results   Component Value Date    WBC 9.61 06/10/2025    HGB 11.5 (L) 06/10/2025    HCT 34.0 (L) 06/10/2025    MCV 93 06/10/2025     06/10/2025       No results for input(s): "PT", "INR", "PROTIME", "APTT" in the last 72 hours.          Pre-op Assessment    I have reviewed the Patient " Summary Reports.     I have reviewed the Nursing Notes. I have reviewed the NPO Status.   I have reviewed the Medications.     Review of Systems  Anesthesia Hx:  No problems with previous Anesthesia   History of prior surgery of interest to airway management or planning:          Denies Family Hx of Anesthesia complications.    Denies Personal Hx of Anesthesia complications.                    Hematology/Oncology:       -- Denies Anemia:                                  Cardiovascular:      Denies Hypertension.    Denies CAD.        Denies CHF.                                   Pulmonary:    Denies COPD.  Denies Asthma.                    Hepatic/GI:      Denies GERD.                Neurological:    Denies CVA.    Denies Seizures.                                Endocrine:  Denies Diabetes.               Physical Exam  General: Well nourished, Alert, Cooperative and Oriented    Airway:  Mallampati: II   Mouth Opening: Normal  TM Distance: Normal  Tongue: Normal  Neck ROM: Normal ROM    Dental:  Intact    Chest/Lungs:  Normal Respiratory Rate    Heart:  Rate: Normal        Anesthesia Plan  Type of Anesthesia, risks & benefits discussed:    Anesthesia Type: Spinal, Epidural, CSE  Intra-op Monitoring Plan: Standard ASA Monitors  Post Op Pain Control Plan: intrathecal opioid  Informed Consent: Informed consent signed with the Patient and all parties understand the risks and agree with anesthesia plan.  All questions answered.   ASA Score: 2  Day of Surgery Review of History & Physical: H&P Update referred to the surgeon/provider.    Ready For Surgery From Anesthesia Perspective.     .           [1]   Patient Active Problem List  Diagnosis    Pruritus of vulva    Possible Lichen sclerosus/Planus    PVC's (premature ventricular contractions)    Anticardiolipin antibody positive    Conceived by in vitro fertilization    Multigravida of advanced maternal age in first trimester    Suspected fetal anomaly not found     Encounter for supervision of normal pregnancy in third trimester    H/O  section complicating pregnancy   [2]   Social History  Socioeconomic History    Marital status:    Tobacco Use    Smoking status: Never    Smokeless tobacco: Never   Substance and Sexual Activity    Alcohol use: No    Drug use: No    Sexual activity: Yes     Partners: Male     Birth control/protection: None     Social Drivers of Health     Financial Resource Strain: Low Risk  (2025)    Overall Financial Resource Strain (CARDIA)     Difficulty of Paying Living Expenses: Not hard at all   Food Insecurity: No Food Insecurity (2025)    Hunger Vital Sign     Worried About Running Out of Food in the Last Year: Never true     Ran Out of Food in the Last Year: Never true   Transportation Needs: No Transportation Needs (2025)    PRAPARE - Transportation     Lack of Transportation (Medical): No     Lack of Transportation (Non-Medical): No   Physical Activity: Inactive (2025)    Exercise Vital Sign     Days of Exercise per Week: 0 days     Minutes of Exercise per Session: 0 min   Stress: No Stress Concern Present (2025)    [place] [employer] - Occupational Stress Questionnaire     Feeling of Stress : Not at all   Housing Stability: Low Risk  (2025)    Housing Stability Vital Sign     Unable to Pay for Housing in the Last Year: No     Number of Times Moved in the Last Year: 0     Homeless in the Last Year: No

## 2025-07-29 NOTE — L&D DELIVERY NOTE
[facility] - Labor & Delivery   Section   Operative Note    SUMMARY      Section Procedure Note    Procedure Date: 2025     Procedure:   1. Repeat  Section via Pfannenstiel skin incision    Indications:   1. Hx C/Sx1     Pre-operative Diagnosis:   1. IUP at 39w5d pregnancy  2. AMA  3. Anticardiolipin +     Post-operative Diagnosis:   same    Surgeon: Leonor Burkett MD      Assistants: Korin Gamboa MD, PGY-2     Anesthesia: Spinal anesthesia    Findings:    1. Single viable  female infant, with APGARS 8/9, weight 3240g.   2. Normal appearing uterus, ovaries, and fallopian tubes.  3. Normal placenta.   4. Excellent hemostasis noted following closure of each tissue layer.    Estimated Blood Loss: 255 mL           Total IV Fluids: See anesthesia report.      UOP: See anesthesia report     Specimens: Placenta, donated     PreOp CBC:   Lab Results   Component Value Date    WBC 8.26 2025    HGB 12.2 2025    HCT 35.6 (L) 2025    MCV 92 2025     2025                     Complications:  None; patient tolerated the procedure well.           Disposition: PACU - hemodynamically stable.           Condition: stable    Procedure Details:   The patient was seen in the holding room. The risks, benefits, complications, treatment options, and expected outcomes were discussed with the patient.  The patient concurred with the proposed plan, giving informed consent. The patient was taken to operating room, identified as Brielle Sharpe and the procedure verified as repeat  delivery. A time out was held and the above information confirmed.    After induction of anesthesia, the patient was prepped and draped in the usual sterile manner while placed in a dorsal supine position with a left lateral tilt.  A duque catheter was also placed per nursing. Preoperative antibiotics were administered and an allis test was performed yielding adequate anesthesia.  A  Pfannenstiel incision was made and carried down through the subcutaneous tissue to the fascia. Fascial incision was made and extended transversely. The fascia was grasped with Kocher clamps and  from the underlying rectus tissue superiorly. The peritoneum was identified, found to be free of adherent bowel and entered bluntly. Peritoneal incision was extended longitudinally. The vesico-uterine peritoneum was identified and bladder blade was inserted. The vesico-uterine peritoneum was incised transversely and the bladder flap was bluntly freed from the lower uterine segment. The bladder blade was reinserted to keep the bladder out of the operative field. A low transverse uterine incision was made with knife. The lower uterine segment was noted to be thin and after the first swipe with the knife, amniotic sac was incised as well as a suspected small laceration on the baby's head. The hysterotomy was extended with in cranio-caudal direction. The fetal head was brought to the incision and elevated out of the pelvis. The patient delivered a single viable female infant without difficulty.  Infant weighed 3240 grams with Apgar scores of 8/9 at one and five minutes respectively. Laceration was confirmed to infant forehead with minimal bleeding. The umbilical cord was clamped and cut. The  was taken to the warmer for suctioning. The placenta was removed intact and appeared normal and was donated. The uterus was exteriorized. Good tone was noted with administration of postpartum pitocin. The uterine outline, tubes and ovaries appeared normal. The uterine incision was closed with running locked sutures of 1 chromic. An additional figure of eight suture of 1 chromic was placed to control a slow bleeding area inferior to the hysterotomy. Hemostasis was observed. The uterus was returned to the abdominal cavity. Incision was reinspected and good hemostasis was noted. The abdominal cavity was swept to remove clots.  "The peritoneum and muscle was reapproximated with running suture of 2-0 vicryl and interrupted suture of 1 chromic, respectively. The fascia was then reapproximated with running sutures of 0 vicryl. The deep dermis was reapproximated with running suture of 2-0 vicryl and skin was reapproximated with 4-0 monocryl. The wound was covered with a silver dressing.     Instrument, sponge, and needle counts were correct prior the abdominal closure and at the conclusion of the case.    Pt tolerated procedure well and was in stable condition after the procedure.    **Noting the thin lower uterine segment present after prior , I would not recommend TOLAC for this patient in the future**    Korin Gamboa MD  Obstetrics & Gynecology, PGY-2     I was present for the entire procedure, and agree with the above resident's assessment of findings and description of procedure. I have made my edits above.   I spoke with the patient, the patient's partner and the pediatrician on call about the incidental laceration.   Leonor Burkett M.D.           Delivery Information for Dre Sharpe    Birth information:  YOB: 2025   Time of birth: 8:17 AM   Sex: female   Head Delivery Date/Time: 2025  8:17 AM   Delivery type: , Low Transverse   Gestational Age: 39w5d       Delivery Providers    Delivering clinician: Leonor Burkett MD   Provider Role    Korin Gamboa MD Resident    Trista Kline RN Circulator    Indu Cabello [facility] Tech    Alexus, Davina Hurt RN Registered Nurse    Sadia Renteria RN Registered Nurse    Leeanna Ochoa MD Anesthesiologist              Measurements    Weight: 3240 g  Weight (lbs): 7 lb 2.3 oz  Length: 50.2 cm  Length (in): 19.75"  Head circumference: 33.5 cm  Chest circumference: 33.3 cm         Apgars    Living status: Living  Apgar Component Scores:  1 min.:  5 min.:  10 min.:  15 min.:  20 min.:    Skin color:  0  1       Heart rate:  2  2 "       Reflex irritability:  2  2       Muscle tone:  2  2       Respiratory effort:  2  2       Total:  8  9       Apgars assigned by: FLORIAN MCDONALD         Operative Delivery    Forceps attempted?: No  Vacuum extractor attempted?: No         Shoulder Dystocia    Shoulder dystocia present?: No           Presentation    Presentation: Vertex           Interventions/Resuscitation    Method: Bulb Suctioning, Tactile Stimulation, Deep Suctioning, CPAP       Cord    Vessels: 3 vessels  Complications: Nuchal  Nuchal Intervention: reduced  Nuchal Cord Description: tight nuchal cord  Number of Loops: 1  Delayed Cord Clamping?: Yes  Cord Clamped Date/Time: 2025  8:18 AM  Cord Blood Disposition: Sent with Baby  Gases Sent?: No  Stem Cell Collection (by MD): No       Placenta    Placenta delivery date/time: 2025 08:19  Placenta removal: Manual removal  Placenta appearance: Intact  Placenta disposition: Donation           Labor Events:       labor: No     Labor Onset Date/Time:         Dilation Complete Date/Time:         Start Pushing Date/Time:         Start Pushing Date/Time:       Rupture Date/Time:            Rupture type:          Fluid Amount:       Fluid Color:                steroids: None     Antibiotics given for GBS: No     Induction:       Indications for induction:        Augmentation:       Indications for augmentation:       Labor complications: None     Additional complications:          Cervical ripening:                     Delivery:      Episiotomy:       Indication for Episiotomy:       Perineal Lacerations:   Repaired:      Periurethral Laceration:   Repaired:     Labial Laceration:   Repaired:     Sulcus Laceration:   Repaired:     Vaginal Laceration:   Repaired:     Cervical Laceration:   Repaired:     Repair suture:       Repair # of packets:       Last Value - EBL - Nursing (mL):       Sum - EBL - Nursing (mL): 0     Last Value - EBL - Anesthesia (mL):      Calculated QBL (mL): 255      Running total QBL (mL): 255     Vaginal Sweep Performed:       Surgicount Correct:       Vaginal Packing:   Quantity:       Other providers:       Anesthesia    Method: Spinal          Details (if applicable):  Trial of Labor No    Categorization: Repeat    Priority: Routine   Indications for : Prior Uterine Surgery   Incision Type: low transverse     Additional  information:  Forceps:    Vacuum:    Breech:    Observed anomalies    Other (Comments):

## 2025-07-29 NOTE — BRIEF OP NOTE
[facility] - Labor & Delivery  Brief Operative Note    SUMMARY     Surgery Date: 2025     Surgeons and Role:     * Leonor Burkett MD - Primary     * Korin Gamboa MD - Resident - Assisting        Pre-op Diagnosis:  Encounter for supervision of other normal pregnancy in third trimester [Z34.83]  H/O  section complicating pregnancy [O34.219]    Post-op Diagnosis:  Post-Op Diagnosis Codes:     * Encounter for supervision of other normal pregnancy in third trimester [Z34.83]     * H/O  section complicating pregnancy [O34.219]    Procedure(s) (LRB):   SECTION (N/A)    Anesthesia: Spinal/Epidural    Implants:  * No implants in log *    Operative Findings:   1. Single viable  female infant, with APGARS 8/9, weight 3240g.   2. Normal appearing uterus, ovaries, and fallopian tubes.  3. Normal placenta.   4. Excellent hemostasis noted following closure of each tissue layer.    Estimated Blood Loss: * No values recorded between 2025  7:55 AM and 2025  9:09 AM *    Estimated Blood Loss has been documented.         Specimens:   Specimen (24h ago, onward)      None          * No specimens in log *    [id number]  Korin Gamboa MD  Obstetrics & Gynecology, PGY-2

## 2025-07-29 NOTE — PLAN OF CARE
VSS. Pain controlled with oral Tylenol and IV Toradol. Breastfeeding with minimal difficulties. Fundus firm and midline with moderate lochia rubra. LTV dressing in place, moderate drainage noted. Hydrocolloid dressing changed at 1250. Gabriel in place, adequate urine output. Passing gas. No concerns at this time.

## 2025-07-29 NOTE — ANESTHESIA PROCEDURE NOTES
Spinal    Diagnosis: IUP  Patient location during procedure: OR  Start time: 7/29/2025 7:58 AM  Timeout: 7/29/2025 7:56 AM  End time: 7/29/2025 8:00 AM    Staffing  Authorizing Provider: Leeanna Ochoa MD  Performing Provider: Arvin Richards MD    Staffing  Performed by: Arvin Richards MD  Authorized by: Leeanna Ochoa MD    Preanesthetic Checklist  Completed: patient identified, IV checked, site marked, risks and benefits discussed, surgical consent, monitors and equipment checked, pre-op evaluation and timeout performed  Spinal Block  Patient position: sitting  Prep: ChloraPrep  Patient monitoring: heart rate, continuous pulse ox and frequent blood pressure checks  Approach: midline  Location: L3-4  Injection technique: single shot  CSF Fluid: clear free-flowing CSF  Needle  Needle type: Jocelyn   Needle gauge: 25 G  Needle length: 3.5 in  Needle localization: anatomical landmarks  Assessment  Sensory level: T4   Dermatomal levels determined by pinch or prick  Ease of block: easy  Patient's tolerance of the procedure: comfortable throughout block and no complaints            
Stable

## 2025-07-29 NOTE — TRANSFER OF CARE
"Anesthesia Transfer of Care Note    Patient: Brielle Sharpe    Procedure(s) Performed: Procedure(s) (LRB):   SECTION (N/A)    Patient location: Labor and Delivery    Anesthesia Type: spinal    Transport from OR: Transported from OR on room air with adequate spontaneous ventilation    Post pain: adequate analgesia    Post assessment: no apparent anesthetic complications    Post vital signs: stable    Level of consciousness: awake, alert and oriented    Nausea/Vomiting: no nausea/vomiting    Complications: none    Transfer of care protocol was followed      Last vitals: Visit Vitals  /80   Pulse 91   Temp 36.6 °C (97.8 °F) (Oral)   Resp 18   Ht 5' 2" (1.575 m)   Wt 65.9 kg (145 lb 4.5 oz)   LMP  (LMP Unknown)   SpO2 99%   Breastfeeding No   BMI 26.57 kg/m²     "

## 2025-07-29 NOTE — LACTATION NOTE
07/29/25 1610   Maternal Assessment   Breast Shape Bilateral:;round   Breast Density Bilateral:;soft   Areola Bilateral:;elastic   Nipples Bilateral:;everted   Maternal Infant Feeding   Maternal Emotional State assist needed;relaxed   Infant Positioning cross-cradle;clutch/football   Signs of Milk Transfer infant jaw motion present  (occasional nutritive sucks noted)   Pain with Feeding no   Comfort Measures Before/During Feeding maternal position adjusted;infant position adjusted;suction broken using finger   Nipple Shape After Feeding, Right slightly compresssed   Latch Assistance yes  (maximum positioning and latch assistance provided)     Visited patient in room, holding swaddled baby in arms, baby giving feeding cues.  Observed patient attempt to latch baby onto R breast, cross cradle position, shallow latch achieved.  Maximum positioning and latch assistance provided, multiple attempts to get baby to open mouth wide and achieve a deep latch - baby does not open mouth wide and drags lower lip into mouth, consultant repeatedly flanged lower lip and exerted pressure on lower jaw to open mouth wider and attempt to get deeper latches c wider gapes.   Baby self removed, R nipple slightly compressed.  Process repeated on R breast, football position, baby latches onto length of nipple only.  Process repeated on L breast and R breast, cross cradle position, patient able to achieve a deeper latch c wider gape on R breast, baby requires stimulation to actively suck and not just hold the breast in her mouth.  Feeding options discussed.  Encouraged parents to hold baby skin-to-skin as much as possible and to spoon feed EBM/donor milk/formula at any feeding where the baby does not become content after a lengthy feeding. Encouraged to call for assistance prn.

## 2025-07-29 NOTE — PROGRESS NOTES
Notified Dr. Diaz of moderate amount of drainage on bandage that is touching the edge. MD states will come assess patient.

## 2025-07-29 NOTE — H&P
HISTORY AND PHYSICAL                                                OBSTETRICS          Subjective:       Brielle Sharpe is a 39 y.o.  female with IUP at 39w5d weeks gestation who presents for planned rLTCS.    Patient denies contractions, denies vaginal bleeding, denies LOF.   Fetal Movement: normal.    This IUP is complicated by AMA, Anticardiolipin +.    Review of Systems   Constitutional:  Negative for chills and fever.   HENT:  Negative for nasal congestion.    Eyes:  Negative for visual disturbance.   Respiratory:  Negative for shortness of breath.    Cardiovascular:  Negative for chest pain.   Gastrointestinal:  Negative for abdominal pain, nausea and vomiting.   Genitourinary:  Negative for dysuria, pelvic pain and vaginal bleeding.   Musculoskeletal:  Negative for back pain.   Integumentary:  Negative for rash.   Neurological:  Negative for syncope.   Psychiatric/Behavioral:  Negative for depression.        PMHx:   Past Medical History:   Diagnosis Date    Breast disorder     Breast cyst in 2019; dense breast tissue    Chicken pox     As a child    Dyspareunia     Always    Endometriosis of uterus     Just did a receptiva test with [facility]    Infertility, female     Multiple rounds of IVF    Nursing difficulty     Trouble feeding twins       PSHx:   Past Surgical History:   Procedure Laterality Date    ABDOMINAL SURGERY      Lap Estefania    ANKLE SURGERY      Left     SECTION  2018    CHOLECYSTECTOMY      FRACTURE SURGERY  ,     HYSTEROSCOPY  3/8/24    HYSTEROSCOPY W/ POLYPECTOMY  2017    SINUS SURGERY         All: Review of patient's allergies indicates:  No Known Allergies    Meds: Prescriptions Prior to Admission[1]    SH: Social History[2]    FH:   Family History   Problem Relation Name Age of Onset    Hypertension Father Elijah Galindo     Thalassemia Father Elijah Galindo     Hypertension Mother Serina Mateo     Hyperlipidemia Mother Serina Mateo     Colon  "cancer Maternal Uncle      Thalassemia Brother Greg Galindo     Thalassemia Sister Pari Galindo     Breast cancer Neg Hx      Cancer Neg Hx      Diabetes Neg Hx      Eclampsia Neg Hx      Miscarriages / Stillbirths Neg Hx      Ovarian cancer Neg Hx       labor Neg Hx      Stroke Neg Hx         OBHx:   OB History    Para Term  AB Living   2 1 1 0 0 2   SAB IAB Ectopic Multiple Live Births   0 0 0 1 2      # Outcome Date GA Lbr Sravan/2nd Weight Sex Type Anes PTL Lv   2 Current            1A Term 18 37w4d  3.005 kg (6 lb 10 oz) M CS-LTranv  N ROMAN   1B Term 18 37w4d  2.693 kg (5 lb 15 oz) F CS-LTranv  N ROMAN       Objective:       /84   Pulse 94   Temp 97.8 °F (36.6 °C) (Oral)   Resp 18   Ht 5' 2" (1.575 m)   Wt 65.9 kg (145 lb 4.5 oz)   LMP  (LMP Unknown)   SpO2 99%   Breastfeeding No   BMI 26.57 kg/m²     Vitals:    25 0558 25 0601   BP:  118/84   Pulse: 85 94   Resp:  18   Temp:  97.8 °F (36.6 °C)   TempSrc:  Oral   SpO2: 99%    Weight:  65.9 kg (145 lb 4.5 oz)   Height:  5' 2" (1.575 m)       General:   alert, appears stated age and cooperative, no apparent distress   HENT:  normocephalic, atraumatic   Eyes:  extraocular movements and conjunctivae normal   Neck:  supple, range of motion normal, no thyromegaly   Lungs:   no respiratory distress   Heart:   regular rate   Abdomen:  soft, non-tender, non-distended but gravid, no rebound or guarding    Extremities negative edema, negative erythema   FHT: Naseline 120, moderate BTBV, accels, -decels;  Cat 1 ()       Presentations: cephalic by ultrasound   Cervix: Deferred                            Sterile Speculum Exam: N/A    EFW by Leopold's: 7#    Recent Growth Scan: N/A    Lab Review  Blood Type A POS  GBBS: negative  Rubella: Immune  RPR: Neg  HIV: negative  HepB: negative       Assessment:       39w5d weeks gestation with here for rLTCS    There are no hospital problems to display for this patient.       "   Plan:   pLTCS   Risks, benefits, alternatives and possible complications have been discussed in detail with the patient.   - Consents signed and to chart  - Admit to Labor and Delivery unit  - Epidural per Anesthesia  - Draw CBC, T&S  - Notify Staff  - EFW 7#    AMA  - Lovenox not indicated    Post-Partum Hemorrhage risk - medium           [1]   Medications Prior to Admission   Medication Sig Dispense Refill Last Dose/Taking    aspirin 81 MG Chew Take 81 mg by mouth once daily.       ondansetron (ZOFRAN-ODT) 4 MG TbDL Take 1 tablet (4 mg total) by mouth daily as needed (nausea). 30 tablet 0     prenatal vit/iron fum/folic ac (PRENATAL 1+1 ORAL) Take by mouth.       vitamin D (VITAMIN D3) 1000 units Tab Take 1,000 Units by mouth once daily.      [2]   Social History  Socioeconomic History    Marital status:    Tobacco Use    Smoking status: Never    Smokeless tobacco: Never   Substance and Sexual Activity    Alcohol use: No    Drug use: No    Sexual activity: Yes     Partners: Male     Birth control/protection: None     Social Drivers of Health     Financial Resource Strain: Low Risk  (7/29/2025)    Overall Financial Resource Strain (CARDIA)     Difficulty of Paying Living Expenses: Not hard at all   Food Insecurity: No Food Insecurity (7/29/2025)    Hunger Vital Sign     Worried About Running Out of Food in the Last Year: Never true     Ran Out of Food in the Last Year: Never true   Transportation Needs: No Transportation Needs (7/29/2025)    PRAPARE - Transportation     Lack of Transportation (Medical): No     Lack of Transportation (Non-Medical): No   Physical Activity: Inactive (6/16/2025)    Exercise Vital Sign     Days of Exercise per Week: 0 days     Minutes of Exercise per Session: 0 min   Stress: No Stress Concern Present (7/29/2025)    [employer] - Occupational Stress Questionnaire     Feeling of Stress : Not at all   Housing Stability: Low Risk  (7/29/2025)    Housing  Stability Vital Sign     Unable to Pay for Housing in the Last Year: No     Number of Times Moved in the Last Year: 0     Homeless in the Last Year: No

## 2025-07-29 NOTE — PLAN OF CARE
"Plan of care:  Mother will nurse baby on cue " 8 or more in 24" and lengthen feedings until content; will achieve a deep, comfortable latch and observe for signs of milk transfer; if unable to achieve a deep latch, patient will hand express both breasts and spoon feed baby EBM/donor milk/formula ad aidan; will monitor baby's 24hr diaper counts; will call for assistance prn.   "

## 2025-07-30 PROBLEM — D62 ABLA (ACUTE BLOOD LOSS ANEMIA): Status: ACTIVE | Noted: 2025-07-30

## 2025-07-30 LAB
ABSOLUTE EOSINOPHIL (OHS): 0.27 K/UL
ABSOLUTE MONOCYTE (OHS): 0.98 K/UL (ref 0.3–1)
ABSOLUTE NEUTROPHIL COUNT (OHS): 6.62 K/UL (ref 1.8–7.7)
BASOPHILS # BLD AUTO: 0.03 K/UL
BASOPHILS NFR BLD AUTO: 0.3 %
ERYTHROCYTE [DISTWIDTH] IN BLOOD BY AUTOMATED COUNT: 13 % (ref 11.5–14.5)
HCT VFR BLD AUTO: 28.1 % (ref 37–48.5)
HGB BLD-MCNC: 9.2 GM/DL (ref 12–16)
IMM GRANULOCYTES # BLD AUTO: 0.04 K/UL (ref 0–0.04)
IMM GRANULOCYTES NFR BLD AUTO: 0.4 % (ref 0–0.5)
LYMPHOCYTES # BLD AUTO: 2.14 K/UL (ref 1–4.8)
MCH RBC QN AUTO: 31 PG (ref 27–31)
MCHC RBC AUTO-ENTMCNC: 32.7 G/DL (ref 32–36)
MCV RBC AUTO: 95 FL (ref 82–98)
NUCLEATED RBC (/100WBC) (OHS): 0 /100 WBC
PLATELET # BLD AUTO: 146 K/UL (ref 150–450)
PMV BLD AUTO: 10.5 FL (ref 9.2–12.9)
RBC # BLD AUTO: 2.97 M/UL (ref 4–5.4)
RELATIVE EOSINOPHIL (OHS): 2.7 %
RELATIVE LYMPHOCYTE (OHS): 21.2 % (ref 18–48)
RELATIVE MONOCYTE (OHS): 9.7 % (ref 4–15)
RELATIVE NEUTROPHIL (OHS): 65.7 % (ref 38–73)
WBC # BLD AUTO: 10.08 K/UL (ref 3.9–12.7)

## 2025-07-30 PROCEDURE — 63600175 PHARM REV CODE 636 W HCPCS: Mod: JZ,TB

## 2025-07-30 PROCEDURE — 11000001 HC ACUTE MED/SURG PRIVATE ROOM

## 2025-07-30 PROCEDURE — 85025 COMPLETE CBC W/AUTO DIFF WBC: CPT | Performed by: STUDENT IN AN ORGANIZED HEALTH CARE EDUCATION/TRAINING PROGRAM

## 2025-07-30 PROCEDURE — 36415 COLL VENOUS BLD VENIPUNCTURE: CPT | Performed by: STUDENT IN AN ORGANIZED HEALTH CARE EDUCATION/TRAINING PROGRAM

## 2025-07-30 PROCEDURE — 25000003 PHARM REV CODE 250

## 2025-07-30 PROCEDURE — 63600175 PHARM REV CODE 636 W HCPCS: Performed by: STUDENT IN AN ORGANIZED HEALTH CARE EDUCATION/TRAINING PROGRAM

## 2025-07-30 RX ORDER — LANOLIN ALCOHOL/MO/W.PET/CERES
1 CREAM (GRAM) TOPICAL DAILY
Status: DISCONTINUED | OUTPATIENT
Start: 2025-07-30 | End: 2025-07-31 | Stop reason: HOSPADM

## 2025-07-30 RX ADMIN — IBUPROFEN 800 MG: 400 TABLET ORAL at 03:07

## 2025-07-30 RX ADMIN — DOCUSATE SODIUM 200 MG: 100 CAPSULE, LIQUID FILLED ORAL at 08:07

## 2025-07-30 RX ADMIN — FERROUS SULFATE TAB 325 MG (65 MG ELEMENTAL FE) 1 EACH: 325 (65 FE) TAB at 08:07

## 2025-07-30 RX ADMIN — ACETAMINOPHEN 650 MG: 325 TABLET ORAL at 05:07

## 2025-07-30 RX ADMIN — ACETAMINOPHEN 650 MG: 325 TABLET ORAL at 11:07

## 2025-07-30 RX ADMIN — ACETAMINOPHEN 650 MG: 325 TABLET ORAL at 06:07

## 2025-07-30 RX ADMIN — KETOROLAC TROMETHAMINE 30 MG: 30 INJECTION, SOLUTION INTRAMUSCULAR at 03:07

## 2025-07-30 RX ADMIN — IBUPROFEN 800 MG: 400 TABLET ORAL at 08:07

## 2025-07-30 RX ADMIN — IBUPROFEN 800 MG: 400 TABLET ORAL at 11:07

## 2025-07-30 RX ADMIN — PRENATAL VIT W/ FE FUMARATE-FA TAB 27-0.8 MG 1 TABLET: 27-0.8 TAB at 08:07

## 2025-07-30 RX ADMIN — ACETAMINOPHEN 650 MG: 325 TABLET ORAL at 01:07

## 2025-07-30 NOTE — MEDICAL/APP STUDENT
"POSTPARTUM PROGRESS NOTE    Subjective:     PPD/POD#: 1   Procedure: Repeat LTCS   EGA: 39w5d   N/V: No   F/C: No   Abd Pain: Mild, well-controlled with oral pain medication   Lochia: Mild   Voiding: Yes   Ambulating: Yes   Bowel fnc: Yes passing flatus   Contraception: Per primary OB - Needed IVF for pregnancy, no plan to use BC      Objective:      Temp:  [96 °F (35.6 °C)-98.2 °F (36.8 °C)] 97.8 °F (36.6 °C)  Pulse:  [] 74  Resp:  [16-18] 17  SpO2:  [96 %-100 %] 98 %  BP: ()/(51-85) 93/51    Abdomen: Soft, appropriately tender   Uterus: Firm, no fundal tenderness   Incision: Bandage in place without shadowing     Lab Review    No results for input(s): "NA", "K", "CL", "CO2", "BUN", "CREATININE", "GLU", "PROT", "BILITOT", "ALKPHOS", "ALT", "AST", "MG", "PHOS" in the last 168 hours.    Recent Labs   Lab 07/29/25  0550 07/30/25  0524   WBC 8.26 10.08   HGB 12.2 9.2*   HCT 35.6* 28.1*   MCV 92 95    146*         I/O    Intake/Output Summary (Last 24 hours) at 7/30/2025 0619  Last data filed at 7/30/2025 0145  Gross per 24 hour   Intake 1500 ml   Output 995 ml   Net 505 ml        Assessment and Plan:   Postpartum care:  - Patient doing well.  - Post op pain well controlled  - Continue routine management and advances.    ABLA  -    - H&H: 12.2/35.6 >9.2/28.1  - Currently asymptomatic  - Repeat CBC   - Continue to monitor for s/s of anemia    AMA  - Lovenox not indicated    Cindy Reza MS-3    "

## 2025-07-30 NOTE — ANESTHESIA POSTPROCEDURE EVALUATION
Anesthesia Post Evaluation    Patient: Brielle Sharpe    Procedure(s) Performed: Procedure(s) (LRB):   SECTION (N/A)    Final Anesthesia Type: spinal      Patient location during evaluation: labor & delivery  Patient participation: Yes- Able to Participate  Level of consciousness: awake and alert  Post-procedure vital signs: reviewed and stable  Pain management: adequate  Airway patency: patent  JOHN mitigation strategies: Use of major conduction anesthesia (spinal/epidural) or peripheral nerve block and Multimodal analgesia  PONV status at discharge: No PONV  Anesthetic complications: no      Cardiovascular status: blood pressure returned to baseline and hemodynamically stable  Respiratory status: unassisted, spontaneous ventilation and room air  Hydration status: euvolemic  Follow-up not needed.              Vitals Value Taken Time   /69 25 09:05   Temp 36.5 °C (97.7 °F) 25 09:05   Pulse 85 25 09:05   Resp 16 25 09:05   SpO2 98 % 25 09:05         Event Time   Out of Recovery 11:20:00         Pain/Bill Score: Pain Rating Prior to Med Admin: 0 (2025  1:05 PM)

## 2025-07-30 NOTE — NURSING
[employer] pump, tubing, collections containers  brought to bedside.  Discussed proper pump setting of initiation phase.  Instructed on proper usage of pump and to adjust suction according to maximum comfort level.  Verified appropriate flange fit.  Educated on the frequency and duration of pumping in order to promote and maintain a full milk supply.  Hands on pumping technique reviewed.  Encouraged hand expression after pumping.  Instructed on cleaning of breast pump parts.  Written instructions also given.  Pt verbalized understanding and appropriate recall for proper milk handling, collection, labeling, storage and transportation.

## 2025-07-30 NOTE — PLAN OF CARE
VSS  Patient denies HA, dizziness, vision changes, and RUQ pain. Pt safety maintained, side rails up, bed low and locked position. Pt ambulating independently and voiding without difficulty. Pain  controlled with  pain medication. Fundus is midline and firm with minimal lochia. Hydrocolloid dressing in place; dressing is clean and dry with no signs of infection. Significant other at bedside and attentive to patient's needs; parents responding to infant cues and bonding appropriately. No additional needs at this time.

## 2025-07-30 NOTE — LACTATION NOTE
LC assisted with latch. Baby latched easily with occasional swallows audible. Pt 's nipples were red and tender on latch but improved after a few sucks. Pt given hydrogels to use after feeding, educated on use and care of hydrogels. Pt encouraged to feed the baby 8 or more times in 24hrs on cue until content. Pt verbalized understanding.    07/30/25 1130   Maternal Assessment   Breast Shape Bilateral:;round   Breast Density Bilateral:;soft   Areola Bilateral:;elastic   Nipples Bilateral:;everted   Left Nipple Symptoms tender;redness   Right Nipple Symptoms tender;redness   Maternal Infant Feeding   Maternal Emotional State assist needed;relaxed   Infant Positioning cross-cradle   Signs of Milk Transfer audible swallow;infant jaw motion present   Nipple Shape After Feeding, Right round   Latch Assistance   (minimal help)   Equipment Type   Breast Pump Type double electric, hospital grade   Breast Pump Flange Type hard   Breast Pump Flange Size 21 mm   Breast Pumping   Breast Pumping Interventions post-feed pumping encouraged   Breast Pumping double electric breast pump utilized

## 2025-07-31 VITALS
WEIGHT: 145.31 LBS | HEIGHT: 62 IN | RESPIRATION RATE: 18 BRPM | BODY MASS INDEX: 26.74 KG/M2 | DIASTOLIC BLOOD PRESSURE: 75 MMHG | OXYGEN SATURATION: 97 % | HEART RATE: 90 BPM | SYSTOLIC BLOOD PRESSURE: 124 MMHG | TEMPERATURE: 98 F

## 2025-07-31 LAB
ABSOLUTE EOSINOPHIL (OHS): 0.3 K/UL
ABSOLUTE MONOCYTE (OHS): 0.57 K/UL (ref 0.3–1)
ABSOLUTE NEUTROPHIL COUNT (OHS): 6.64 K/UL (ref 1.8–7.7)
BASOPHILS # BLD AUTO: 0.05 K/UL
BASOPHILS NFR BLD AUTO: 0.5 %
ERYTHROCYTE [DISTWIDTH] IN BLOOD BY AUTOMATED COUNT: 13.4 % (ref 11.5–14.5)
HCT VFR BLD AUTO: 28.7 % (ref 37–48.5)
HGB BLD-MCNC: 9.7 GM/DL (ref 12–16)
IMM GRANULOCYTES # BLD AUTO: 0.03 K/UL (ref 0–0.04)
IMM GRANULOCYTES NFR BLD AUTO: 0.3 % (ref 0–0.5)
LYMPHOCYTES # BLD AUTO: 1.56 K/UL (ref 1–4.8)
MCH RBC QN AUTO: 31.9 PG (ref 27–31)
MCHC RBC AUTO-ENTMCNC: 33.8 G/DL (ref 32–36)
MCV RBC AUTO: 94 FL (ref 82–98)
NUCLEATED RBC (/100WBC) (OHS): 0 /100 WBC
PLATELET # BLD AUTO: 148 K/UL (ref 150–450)
PMV BLD AUTO: 11.2 FL (ref 9.2–12.9)
RBC # BLD AUTO: 3.04 M/UL (ref 4–5.4)
RELATIVE EOSINOPHIL (OHS): 3.3 %
RELATIVE LYMPHOCYTE (OHS): 17 % (ref 18–48)
RELATIVE MONOCYTE (OHS): 6.2 % (ref 4–15)
RELATIVE NEUTROPHIL (OHS): 72.7 % (ref 38–73)
WBC # BLD AUTO: 9.15 K/UL (ref 3.9–12.7)

## 2025-07-31 PROCEDURE — 25000003 PHARM REV CODE 250

## 2025-07-31 PROCEDURE — 36415 COLL VENOUS BLD VENIPUNCTURE: CPT | Performed by: STUDENT IN AN ORGANIZED HEALTH CARE EDUCATION/TRAINING PROGRAM

## 2025-07-31 PROCEDURE — 85025 COMPLETE CBC W/AUTO DIFF WBC: CPT | Performed by: STUDENT IN AN ORGANIZED HEALTH CARE EDUCATION/TRAINING PROGRAM

## 2025-07-31 RX ORDER — ONDANSETRON HYDROCHLORIDE 2 MG/ML
4 INJECTION, SOLUTION INTRAVENOUS EVERY 12 HOURS PRN
Status: DISCONTINUED | OUTPATIENT
Start: 2025-07-31 | End: 2025-07-31

## 2025-07-31 RX ORDER — OXYCODONE HYDROCHLORIDE 5 MG/1
5 TABLET ORAL EVERY 4 HOURS PRN
Qty: 15 TABLET | Refills: 0 | Status: SHIPPED | OUTPATIENT
Start: 2025-07-31

## 2025-07-31 RX ORDER — AMOXICILLIN 250 MG
1 CAPSULE ORAL DAILY
Qty: 14 TABLET | Refills: 0 | Status: SHIPPED | OUTPATIENT
Start: 2025-07-31 | End: 2025-08-14

## 2025-07-31 RX ORDER — PROCHLORPERAZINE EDISYLATE 5 MG/ML
5 INJECTION INTRAMUSCULAR; INTRAVENOUS EVERY 6 HOURS PRN
Status: DISCONTINUED | OUTPATIENT
Start: 2025-07-31 | End: 2025-07-31

## 2025-07-31 RX ORDER — FERROUS SULFATE 325(65) MG
325 TABLET ORAL
Qty: 30 TABLET | Refills: 1 | Status: SHIPPED | OUTPATIENT
Start: 2025-07-31

## 2025-07-31 RX ORDER — NALOXONE HCL 0.4 MG/ML
0.04 VIAL (ML) INJECTION
Status: DISCONTINUED | OUTPATIENT
Start: 2025-07-31 | End: 2025-07-31 | Stop reason: HOSPADM

## 2025-07-31 RX ORDER — DEXTROMETHORPHAN HYDROBROMIDE, GUAIFENESIN 5; 100 MG/5ML; MG/5ML
650 LIQUID ORAL EVERY 6 HOURS PRN
Qty: 30 TABLET | Refills: 1 | Status: SHIPPED | OUTPATIENT
Start: 2025-07-31

## 2025-07-31 RX ORDER — IBUPROFEN 600 MG/1
600 TABLET, FILM COATED ORAL EVERY 6 HOURS PRN
Qty: 30 TABLET | Refills: 1 | Status: SHIPPED | OUTPATIENT
Start: 2025-07-31

## 2025-07-31 RX ADMIN — IBUPROFEN 800 MG: 400 TABLET ORAL at 06:07

## 2025-07-31 RX ADMIN — FERROUS SULFATE TAB 325 MG (65 MG ELEMENTAL FE) 1 EACH: 325 (65 FE) TAB at 07:07

## 2025-07-31 RX ADMIN — DOCUSATE SODIUM 200 MG: 100 CAPSULE, LIQUID FILLED ORAL at 07:07

## 2025-07-31 RX ADMIN — ACETAMINOPHEN 650 MG: 325 TABLET ORAL at 05:07

## 2025-07-31 RX ADMIN — PRENATAL VIT W/ FE FUMARATE-FA TAB 27-0.8 MG 1 TABLET: 27-0.8 TAB at 07:07

## 2025-07-31 NOTE — LACTATION NOTE
07/31/25 0920   Maternal Assessment   Breast Shape Bilateral:;round   Breast Density Bilateral:;soft   Areola Bilateral:;elastic   Nipples Bilateral:;everted   Left Nipple Symptoms tender   Right Nipple Symptoms tender   Maternal Infant Feeding   Maternal Emotional State independent   Infant Positioning cross-cradle   Signs of Milk Transfer audible swallow;infant jaw motion present  (when sns used)   Pain with Feeding no   Nipple Shape After Feeding, Right round   Latch Assistance no   Equipment Type   Breast Pump Flange Type hard   Breast Pump Flange Size 21 mm   Breast Pumping   Breast Pumping Interventions post-feed pumping encouraged   Breast Pumping double electric breast pump utilized   Community Referrals   Community Referrals outpatient lactation program;support group  (community resources)     Pt discussed baby's weight loss of 9.4% and supplementation. Pt asked for supplementation to take home. Pt latches baby to breast independently. Deep latch achieved. Baby sleepy at breast and needs stimulation to suck effectively. Discussed using sns with formula at breast. Pt willing to try sns. Baby did well with rhythmic sucking observed. Educated pt and fob on use of sns and cleaning of equipment. Pt pumped after feeding and was able to express 0.8 mls. Encouragement given. Discharge breastfeeding education provided. Questions answered. Encourage pt to supplement as needed using sns, pumped after feedings if supplement is given. Pt to use her spectra pump and is aware of [employer]. Pt has lactation contact number and community resources.

## 2025-07-31 NOTE — PROGRESS NOTES
"POSTPARTUM PROGRESS NOTE    Subjective:     PPD/POD#: 2   Procedure: Repeat LTCS   EGA: 39w5d   N/V: No   F/C: No   Abd Pain: Mild, well-controlled with oral pain medication   Lochia: Mild   Voiding: Yes   Ambulating: Yes   Bowel fnc: Yes passing flatus   Contraception: IVF this pregnancy, no plan to use BC      Objective:      Temp:  [97.7 °F (36.5 °C)-98.4 °F (36.9 °C)] 98.2 °F (36.8 °C)  Pulse:  [] 74  Resp:  [16-18] 17  SpO2:  [97 %-98 %] 98 %  BP: ()/(59-73) 99/59    Abdomen: Soft, appropriately tender, moderately distended   Uterus: Firm, no fundal tenderness   Incision: Bandage in place without shadowing     Lab Review    No results for input(s): "NA", "K", "CL", "CO2", "BUN", "CREATININE", "GLU", "PROT", "BILITOT", "ALKPHOS", "ALT", "AST", "MG", "PHOS" in the last 168 hours.    Recent Labs   Lab 07/29/25  0550 07/30/25  0524   WBC 8.26 10.08   HGB 12.2 9.2*   HCT 35.6* 28.1*   MCV 92 95    146*         I/O    Intake/Output Summary (Last 24 hours) at 7/31/2025 0642  Last data filed at 7/30/2025 0819  Gross per 24 hour   Intake --   Output 150 ml   Net -150 ml        Assessment and Plan:   Postpartum care:  - Patient doing well.  - Post op pain well controlled  - Continue routine management and advances.    ABLA  -    - H&H: 12.2/35.6 >9.2/28.1  - asymptomatic  - Continue to monitor for s/s of anemia    AMA  - Lovenox not indicated    Korin Gamboa MD  Obstetrics & Gynecology, PGY-2   "

## 2025-07-31 NOTE — DISCHARGE SUMMARY
"Delivery Discharge Summary  Obstetrics      Primary OB Clinician: No att. providers found     Admission date: 2025  Discharge date: 2025    Disposition: To home, self care    Discharge Diagnosis List:Problem List[1]    Procedure: , due to Hx C/S x1 not desiring TOLAC     Hospital Course:  Brielle Sharpe is a 39 y.o. now , POD #2 who was admitted on 2025 at 39w5d for rLTCS. IUP complicated by AMA, Anticardiolipin + Patient was subsequently admitted to labor and delivery unit with signed consents. Planned  section for Hx C/Sx1 (not desiring TOLAC) was performed without complications.    Please see delivery note for further details. Her postpartum course was complicated by asymptomatic ABLA. On discharge day, patient's pain is controlled with oral pain medications. Pt is tolerating ambulation without SOB or CP, and regular diet without N/V. Reports lochia is mild. Denies any HA, vision changes, F/C, LE swelling. Denies any breast pain/soreness.    Pt in stable condition and ready for discharge. She has been instructed to start and/or continue medications and follow up with her obstetrics provider as listed below.    Pertinent studies:  CBC  Recent Labs   Lab 25  0550 25  0524 25  0559   WBC 8.26 10.08 9.15   HGB 12.2 9.2* 9.7*   HCT 35.6* 28.1* 28.7*   MCV 92 95 94    146* 148*        Immunization History   Administered Date(s) Administered    Rsv, Bivalent, Rsvpref (Abrysvo) 2025    Tdap 2025        Delivery:    Episiotomy:     Lacerations:     Repair suture:     Repair # of packets:     Blood loss (ml):       Birth information:  YOB: 2025   Time of birth: 8:17 AM   Sex: female   Delivery type: , Low Transverse   Gestational Age: 39w5d     Measurements    Weight: 3240 g  Weight (lbs): 7 lb 2.3 oz  Length: 50.2 cm  Length (in): 19.75"  Head circumference: 33.5 cm  Chest circumference: 33.3 cm         Delivery Clinician: " Delivery Providers    Delivering clinician: Leonor Burkett MD   Provider Role    Korin Gamboa MD Resident    Trista Kline RN Circulator    DeaIndu wallace [facility] Tech    Alexus, Davina Hurt RN Registered Nurse    Sadia Renteria RN Registered Nurse    Leeanna Ochoa MD Anesthesiologist             Additional  information:  Forceps:    Vacuum:    Breech:    Observed anomalies      Living?:     Apgars    Living status: Living  Apgar Component Scores:  1 min.:  5 min.:  10 min.:  15 min.:  20 min.:    Skin color:  0  1       Heart rate:  2  2       Reflex irritability:  2  2       Muscle tone:  2  2       Respiratory effort:  2  2       Total:  8  9       Apgars assigned by: FLORIAN MCDONALD         Placenta: Delivered:       appearance        2025     9:00 AM   [place]  Depression Scale   I have been able to laugh and see the funny side of things. 0   I have looked forward with enjoyment to things. 0   I have blamed myself unnecessarily when things went wrong. 2   I have been anxious or worried for no good reason. 2   I have felt scared or panicky for no good reason. 0   Things have been getting on top of me. 0   I have been so unhappy that I have had difficulty sleeping. 0   I have felt sad or miserable. 0   I have been so unhappy that I have been crying. 0   The thought of harming myself has occurred to me. 0       Patient Instructions:   Discharge Medication List as of 2025  9:55 AM        START taking these medications    Details   acetaminophen (TYLENOL) 650 MG TbSR Take 1 tablet (650 mg total) by mouth every 6 (six) hours as needed., Starting Thu 2025, Normal      ferrous sulfate (IRON) 325 mg (65 mg iron) Tab tablet Take 1 tablet (325 mg total) by mouth daily with breakfast., Starting Thu 2025, Normal      ibuprofen (ADVIL,MOTRIN) 600 MG tablet Take 1 tablet (600 mg total) by mouth every 6 (six) hours as needed., Starting u 2025, Normal      oxyCODONE  (ROXICODONE) 5 MG immediate release tablet Take 1 tablet (5 mg total) by mouth every 4 (four) hours as needed for Pain., Starting Thu 7/31/2025, Normal      senna-docusate (PERICOLACE) 8.6-50 mg per tablet Take 1 tablet by mouth once daily. for 14 days, Starting Thu 7/31/2025, Until Thu 8/14/2025, Normal           CONTINUE these medications which have NOT CHANGED    Details   prenatal vit/iron fum/folic ac (PRENATAL 1+1 ORAL) Take by mouth., Historical Med      vitamin D (VITAMIN D3) 1000 units Tab Take 1,000 Units by mouth once daily., Historical Med           STOP taking these medications       aspirin 81 MG Chew Comments:   Reason for Stopping:         ondansetron (ZOFRAN-ODT) 4 MG TbDL Comments:   Reason for Stopping:               Discharge Procedure Orders   Diet Adult Regular     Lifting restrictions   Order Comments: No lifting more than 15 pounds for 6 weeks     No driving until:   Order Comments: - Not taking narcotic pain medications  - You feel that you can safely slam on the brakes     Pelvic Rest   Order Comments: Nothing in vagina until evaluation at postpartum visit (no sex, tampons, or douching)     No dressing needed     Notify your health care provider if you experience any of the following:  temperature >100.4     Notify your health care provider if you experience any of the following:  persistent nausea and vomiting or diarrhea     Notify your health care provider if you experience any of the following:  severe uncontrolled pain     Notify your health care provider if you experience any of the following:  redness, tenderness, or signs of infection (pain, swelling, redness, odor or green/yellow discharge around incision site)     Notify your health care provider if you experience any of the following:  difficulty breathing or increased cough     Notify your health care provider if you experience any of the following:  severe persistent headache     Notify your health care provider if you  experience any of the following:  worsening rash     Notify your health care provider if you experience any of the following:  persistent dizziness, light-headedness, or visual disturbances     Notify your health care provider if you experience any of the following:  increased confusion or weakness     Notify your health care provider if you experience any of the following:   Order Comments: - Heavy vaginal bleeding soaking through more than 2 pads/hour for > 2 hours  - Severe abdominal pain  - Drainage from your incision  - Headache not relieved with Tylenol  - Vision changes  - Chest pain or shortness or breath     Activity as tolerated     Shower on day dressing removed (No bath)   Order Comments: Do not submerge your incision in a bathtub until evaluation at postpartum visit        Follow-up Information       Leonor Burkett MD Follow up in 6 week(s).    Specialty: Obstetrics and Gynecology  Why: Postpartum visit  Contact information:  [address] [address]  [phone]                            Korin Gamboa MD  Obstetrics & Gynecology, PGY-2          [1]   Patient Active Problem List  Diagnosis    Possible Lichen sclerosus/Planus    PVC's (premature ventricular contractions)    Anticardiolipin antibody positive    Conceived by in vitro fertilization    Multigravida of advanced maternal age in first trimester    Suspected fetal anomaly not found    Encounter for supervision of normal pregnancy in third trimester    H/O  section complicating pregnancy    ABLA (acute blood loss anemia)

## 2025-08-04 ENCOUNTER — PATIENT MESSAGE (OUTPATIENT)
Dept: OBSTETRICS AND GYNECOLOGY | Facility: OTHER | Age: 40
End: 2025-08-04
Payer: COMMERCIAL

## 2025-08-20 ENCOUNTER — PATIENT MESSAGE (OUTPATIENT)
Dept: SURGERY | Facility: CLINIC | Age: 40
End: 2025-08-20
Payer: COMMERCIAL

## 2025-08-20 ENCOUNTER — E-VISIT (OUTPATIENT)
Dept: OBSTETRICS AND GYNECOLOGY | Facility: CLINIC | Age: 40
End: 2025-08-20
Payer: COMMERCIAL

## 2025-08-20 DIAGNOSIS — Z91.89 BREASTFEEDING PROBLEM: Primary | ICD-10-CM

## 2025-08-20 RX ORDER — DICLOXACILLIN SODIUM 500 MG/1
500 CAPSULE ORAL EVERY 6 HOURS
Qty: 40 CAPSULE | Refills: 0 | Status: SHIPPED | OUTPATIENT
Start: 2025-08-20 | End: 2025-08-30